# Patient Record
Sex: FEMALE | Race: WHITE | NOT HISPANIC OR LATINO | Employment: OTHER | URBAN - METROPOLITAN AREA
[De-identification: names, ages, dates, MRNs, and addresses within clinical notes are randomized per-mention and may not be internally consistent; named-entity substitution may affect disease eponyms.]

---

## 2017-01-31 RX ORDER — BACLOFEN 10 MG/1
TABLET ORAL
Qty: 90 TABLET | Refills: 1 | Status: SHIPPED | OUTPATIENT
Start: 2017-01-31 | End: 2017-04-06 | Stop reason: SDUPTHER

## 2017-02-28 DIAGNOSIS — F32.A DEPRESSION: ICD-10-CM

## 2017-02-28 DIAGNOSIS — I10 BENIGN ESSENTIAL HYPERTENSION: ICD-10-CM

## 2017-03-03 RX ORDER — CLONAZEPAM 1 MG/1
TABLET ORAL
Qty: 45 TABLET | Refills: 2 | OUTPATIENT
Start: 2017-03-03 | End: 2017-12-29 | Stop reason: SDUPTHER

## 2017-03-03 RX ORDER — VENLAFAXINE HYDROCHLORIDE 75 MG/1
CAPSULE, EXTENDED RELEASE ORAL
Qty: 30 CAPSULE | Refills: 0 | Status: SHIPPED | OUTPATIENT
Start: 2017-03-03 | End: 2017-10-11 | Stop reason: SDUPTHER

## 2017-03-03 RX ORDER — IRBESARTAN 150 MG/1
TABLET ORAL
Qty: 30 TABLET | Refills: 0 | Status: SHIPPED | OUTPATIENT
Start: 2017-03-03 | End: 2017-07-18 | Stop reason: SDUPTHER

## 2017-03-30 DIAGNOSIS — F32.A DEPRESSION: ICD-10-CM

## 2017-03-30 RX ORDER — VENLAFAXINE HYDROCHLORIDE 75 MG/1
CAPSULE, EXTENDED RELEASE ORAL
Qty: 90 CAPSULE | Refills: 0 | Status: SHIPPED | OUTPATIENT
Start: 2017-03-30 | End: 2017-05-03 | Stop reason: SDUPTHER

## 2017-04-06 RX ORDER — BACLOFEN 10 MG/1
TABLET ORAL
Qty: 90 TABLET | Refills: 1 | Status: SHIPPED | OUTPATIENT
Start: 2017-04-06 | End: 2017-05-19 | Stop reason: SDUPTHER

## 2017-04-27 ENCOUNTER — OFFICE VISIT (OUTPATIENT)
Dept: FAMILY MEDICINE CLINIC | Facility: CLINIC | Age: 52
End: 2017-04-27

## 2017-04-27 VITALS
HEIGHT: 68 IN | WEIGHT: 160 LBS | SYSTOLIC BLOOD PRESSURE: 140 MMHG | BODY MASS INDEX: 24.25 KG/M2 | OXYGEN SATURATION: 100 % | HEART RATE: 78 BPM | DIASTOLIC BLOOD PRESSURE: 96 MMHG

## 2017-04-27 DIAGNOSIS — B02.9 VARICELLA ZOSTER: Primary | ICD-10-CM

## 2017-04-27 PROCEDURE — 99213 OFFICE O/P EST LOW 20 MIN: CPT | Performed by: NURSE PRACTITIONER

## 2017-04-27 RX ORDER — GABAPENTIN 300 MG/1
CAPSULE ORAL
Refills: 0 | COMMUNITY
Start: 2017-04-13 | End: 2020-04-10

## 2017-04-27 RX ORDER — VALACYCLOVIR HYDROCHLORIDE 1 G/1
1000 TABLET, FILM COATED ORAL 3 TIMES DAILY
Qty: 21 TABLET | Refills: 0 | Status: SHIPPED | OUTPATIENT
Start: 2017-04-27 | End: 2017-05-12

## 2017-04-27 NOTE — PROGRESS NOTES
"Subjective Patient presents with a painful rash on her lower back that she noticed this morning on her way to work. The area does sting.   Jaimee Leonardo is a 52 y.o. female.     Chief Complaint   Patient presents with   • Rash   rash to back  Social History   Substance Use Topics   • Smoking status: Current Every Day Smoker     Packs/day: 0.50     Types: Cigarettes   • Smokeless tobacco: None   • Alcohol use None       History of Present Illness   Rash erupted last night, is very painful, pain is constant and there is nothing that relieves it.  The following portions of the patient's history were reviewed and updated as appropriate:PMHroutine: Past Medical History, Allergies, Current Medications, Active Problem List and Health Maintenance    Review of Systems   Skin: Positive for rash.       Objective   Vitals:    04/27/17 1135   BP: 140/96   Pulse: 78   SpO2: 100%   Weight: 160 lb (72.6 kg)   Height: 68\" (172.7 cm)     Body mass index is 24.33 kg/(m^2).    Physical Exam   Constitutional: She appears well-developed and well-nourished. No distress.   HENT:   Head: Normocephalic and atraumatic.   Right Ear: External ear normal.   Left Ear: External ear normal.   Eyes: EOM are normal.   Neck: Neck supple. No thyromegaly present.   Cardiovascular: Normal rate, regular rhythm and normal heart sounds.    Pulmonary/Chest: Effort normal and breath sounds normal.   Musculoskeletal: Normal range of motion.   Neurological: She is alert.   Skin: Skin is warm.   Linear vesicular rash to rt buttock   Nursing note and vitals reviewed.      Assessment/Plan   Problem List Items Addressed This Visit     None      Visit Diagnoses     Varicella zoster    -  Primary    Relevant Medications    valACYclovir (VALTREX) 1000 MG tablet             "

## 2017-04-28 ENCOUNTER — TELEPHONE (OUTPATIENT)
Dept: FAMILY MEDICINE CLINIC | Facility: CLINIC | Age: 52
End: 2017-04-28

## 2017-04-28 RX ORDER — ONDANSETRON 4 MG/1
4-8 TABLET, FILM COATED ORAL EVERY 8 HOURS PRN
Qty: 15 TABLET | Refills: 0 | Status: SHIPPED | OUTPATIENT
Start: 2017-04-28 | End: 2017-05-12

## 2017-05-03 DIAGNOSIS — F32.A DEPRESSION: ICD-10-CM

## 2017-05-03 RX ORDER — VENLAFAXINE HYDROCHLORIDE 75 MG/1
CAPSULE, EXTENDED RELEASE ORAL
Qty: 90 CAPSULE | Refills: 0 | Status: SHIPPED | OUTPATIENT
Start: 2017-05-03 | End: 2017-10-11 | Stop reason: SDUPTHER

## 2017-05-12 ENCOUNTER — OFFICE VISIT (OUTPATIENT)
Dept: FAMILY MEDICINE CLINIC | Facility: CLINIC | Age: 52
End: 2017-05-12

## 2017-05-12 VITALS
SYSTOLIC BLOOD PRESSURE: 124 MMHG | HEIGHT: 68 IN | BODY MASS INDEX: 23.95 KG/M2 | WEIGHT: 158 LBS | DIASTOLIC BLOOD PRESSURE: 84 MMHG

## 2017-05-12 DIAGNOSIS — K62.5 BRBPR (BRIGHT RED BLOOD PER RECTUM): ICD-10-CM
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
## 2017-05-12 DIAGNOSIS — K58.1 IRRITABLE BOWEL SYNDROME WITH CONSTIPATION: ICD-10-CM

## 2017-05-12 DIAGNOSIS — K30 INDIGESTION: ICD-10-CM

## 2017-05-12 DIAGNOSIS — Z00.00 HEALTHCARE MAINTENANCE: Primary | ICD-10-CM

## 2017-05-12 PROCEDURE — 90732 PPSV23 VACC 2 YRS+ SUBQ/IM: CPT | Performed by: FAMILY MEDICINE

## 2017-05-12 PROCEDURE — 90471 IMMUNIZATION ADMIN: CPT | Performed by: FAMILY MEDICINE

## 2017-05-12 PROCEDURE — 99396 PREV VISIT EST AGE 40-64: CPT | Performed by: FAMILY MEDICINE

## 2017-05-12 RX ORDER — HYDROCODONE BITARTRATE AND ACETAMINOPHEN 5; 325 MG/1; MG/1
TABLET ORAL
Refills: 0 | COMMUNITY
Start: 2017-02-16 | End: 2020-04-10

## 2017-05-12 RX ORDER — ACETAMINOPHEN AND CODEINE PHOSPHATE 300; 30 MG/1; MG/1
TABLET ORAL
Refills: 0 | COMMUNITY
Start: 2017-04-13 | End: 2017-10-11

## 2017-05-19 RX ORDER — RIZATRIPTAN BENZOATE 10 MG/1
TABLET ORAL
Qty: 12 TABLET | Refills: 1 | Status: SHIPPED | OUTPATIENT
Start: 2017-05-19 | End: 2017-08-24 | Stop reason: SDUPTHER

## 2017-05-19 RX ORDER — BACLOFEN 10 MG/1
TABLET ORAL
Qty: 90 TABLET | Refills: 1 | Status: SHIPPED | OUTPATIENT
Start: 2017-05-19 | End: 2018-05-15

## 2017-05-26 ENCOUNTER — TELEPHONE (OUTPATIENT)
Dept: FAMILY MEDICINE CLINIC | Facility: CLINIC | Age: 52
End: 2017-05-26

## 2017-05-26 LAB
CHOLEST SERPL-MCNC: 212 MG/DL (ref ?–200)
HDLC SERPL-MCNC: 20 MG/DL (ref ?–60)
LDLC/HDLC SERPL: 2.57 {RATIO}
TRIGL SERPL-MCNC: 167 MG/DL (ref ?–150)

## 2017-06-01 RX ORDER — VENLAFAXINE HYDROCHLORIDE 75 MG/1
CAPSULE, EXTENDED RELEASE ORAL
Qty: 90 CAPSULE | Refills: 0 | Status: SHIPPED | OUTPATIENT
Start: 2017-06-01 | End: 2017-06-30 | Stop reason: SDUPTHER

## 2017-06-30 RX ORDER — VENLAFAXINE HYDROCHLORIDE 75 MG/1
CAPSULE, EXTENDED RELEASE ORAL
Qty: 90 CAPSULE | Refills: 0 | Status: SHIPPED | OUTPATIENT
Start: 2017-06-30 | End: 2017-07-18 | Stop reason: SDUPTHER

## 2017-07-18 DIAGNOSIS — I10 BENIGN ESSENTIAL HYPERTENSION: ICD-10-CM

## 2017-07-18 RX ORDER — VENLAFAXINE HYDROCHLORIDE 75 MG/1
CAPSULE, EXTENDED RELEASE ORAL
Qty: 270 CAPSULE | Refills: 0 | Status: SHIPPED | OUTPATIENT
Start: 2017-07-18 | End: 2017-10-11 | Stop reason: SDUPTHER

## 2017-07-19 RX ORDER — IRBESARTAN 150 MG/1
150 TABLET ORAL DAILY
Qty: 90 TABLET | Refills: 0 | Status: SHIPPED | OUTPATIENT
Start: 2017-07-19 | End: 2017-10-16 | Stop reason: SDUPTHER

## 2017-07-20 ENCOUNTER — TELEPHONE (OUTPATIENT)
Dept: FAMILY MEDICINE CLINIC | Facility: CLINIC | Age: 52
End: 2017-07-20

## 2017-08-24 RX ORDER — RIZATRIPTAN BENZOATE 10 MG/1
TABLET ORAL
Qty: 12 TABLET | Refills: 1 | Status: SHIPPED | OUTPATIENT
Start: 2017-08-24 | End: 2018-05-15 | Stop reason: SDUPTHER

## 2017-10-11 ENCOUNTER — OFFICE VISIT (OUTPATIENT)
Dept: FAMILY MEDICINE CLINIC | Facility: CLINIC | Age: 52
End: 2017-10-11

## 2017-10-11 VITALS
RESPIRATION RATE: 16 BRPM | SYSTOLIC BLOOD PRESSURE: 138 MMHG | OXYGEN SATURATION: 98 % | WEIGHT: 167 LBS | HEIGHT: 68 IN | BODY MASS INDEX: 25.31 KG/M2 | DIASTOLIC BLOOD PRESSURE: 90 MMHG | HEART RATE: 75 BPM

## 2017-10-11 DIAGNOSIS — Z71.6 ENCOUNTER FOR SMOKING CESSATION COUNSELING: Primary | ICD-10-CM

## 2017-10-11 PROCEDURE — 99213 OFFICE O/P EST LOW 20 MIN: CPT | Performed by: NURSE PRACTITIONER

## 2017-10-11 RX ORDER — LIDOCAINE 50 MG/G
PATCH TOPICAL
COMMUNITY
Start: 2017-08-24 | End: 2021-08-06

## 2017-10-11 NOTE — PATIENT INSTRUCTIONS
Steps to Quit Smoking   Smoking tobacco can be harmful to your health and can affect almost every organ in your body. Smoking puts you, and those around you, at risk for developing many serious chronic diseases. Quitting smoking is difficult, but it is one of the best things that you can do for your health. It is never too late to quit.  WHAT ARE THE BENEFITS OF QUITTING SMOKING?  When you quit smoking, you lower your risk of developing serious diseases and conditions, such as:  · Lung cancer or lung disease, such as COPD.  · Heart disease.  · Stroke.  · Heart attack.  · Infertility.  · Osteoporosis and bone fractures.  Additionally, symptoms such as coughing, wheezing, and shortness of breath may get better when you quit. You may also find that you get sick less often because your body is stronger at fighting off colds and infections. If you are pregnant, quitting smoking can help to reduce your chances of having a baby of low birth weight.  HOW DO I GET READY TO QUIT?  When you decide to quit smoking, create a plan to make sure that you are successful. Before you quit:  · Pick a date to quit. Set a date within the next two weeks to give you time to prepare.  · Write down the reasons why you are quitting. Keep this list in places where you will see it often, such as on your bathroom mirror or in your car or wallet.  · Identify the people, places, things, and activities that make you want to smoke (triggers) and avoid them. Make sure to take these actions:    Throw away all cigarettes at home, at work, and in your car.    Throw away smoking accessories, such as ashtrays and lighters.    Clean your car and make sure to empty the ashtray.    Clean your home, including curtains and carpets.  · Tell your family, friends, and coworkers that you are quitting. Support from your loved ones can make quitting easier.  · Talk with your health care provider about your options for quitting smoking.  · Find out what treatment  "options are covered by your health insurance.  WHAT STRATEGIES CAN I USE TO QUIT SMOKING?   Talk with your healthcare provider about different strategies to quit smoking. Some strategies include:  · Quitting smoking altogether instead of gradually lessening how much you smoke over a period of time. Research shows that quitting \"cold turkey\" is more successful than gradually quitting.  · Attending in-person counseling to help you build problem-solving skills. You are more likely to have success in quitting if you attend several counseling sessions. Even short sessions of 10 minutes can be effective.  · Finding resources and support systems that can help you to quit smoking and remain smoke-free after you quit. These resources are most helpful when you use them often. They can include:    Online chats with a counselor.    Telephone quitlines.    Printed self-help materials.    Support groups or group counseling.    Text messaging programs.    Mobile phone applications.  · Taking medicines to help you quit smoking. (If you are pregnant or breastfeeding, talk with your health care provider first.) Some medicines contain nicotine and some do not. Both types of medicines help with cravings, but the medicines that include nicotine help to relieve withdrawal symptoms. Your health care provider may recommend:    Nicotine patches, gum, or lozenges.    Nicotine inhalers or sprays.    Non-nicotine medicine that is taken by mouth.  Talk with your health care provider about combining strategies, such as taking medicines while you are also receiving in-person counseling. Using these two strategies together makes you more likely to succeed in quitting than if you used either strategy on its own.  If you are pregnant or breastfeeding, talk with your health care provider about finding counseling or other support strategies to quit smoking. Do not take medicine to help you quit smoking unless told to do so by your health care " provider.  WHAT THINGS CAN I DO TO MAKE IT EASIER TO QUIT?  Quitting smoking might feel overwhelming at first, but there is a lot that you can do to make it easier. Take these important actions:  · Reach out to your family and friends and ask that they support and encourage you during this time. Call telephone quitlines, reach out to support groups, or work with a counselor for support.  · Ask people who smoke to avoid smoking around you.  · Avoid places that trigger you to smoke, such as bars, parties, or smoke-break areas at work.  · Spend time around people who do not smoke.  · Lessen stress in your life, because stress can be a smoking trigger for some people. To lessen stress, try:    Exercising regularly.    Deep-breathing exercises.    Yoga.    Meditating.    Performing a body scan. This involves closing your eyes, scanning your body from head to toe, and noticing which parts of your body are particularly tense. Purposefully relax the muscles in those areas.  · Download or purchase mobile phone or tablet apps (applications) that can help you stick to your quit plan by providing reminders, tips, and encouragement. There are many free apps, such as QuitGuide from the CDC (Centers for Disease Control and Prevention). You can find other support for quitting smoking (smoking cessation) through smokefree.gov and other websites.  HOW WILL I FEEL WHEN I QUIT SMOKING?  Within the first 24 hours of quitting smoking, you may start to feel some withdrawal symptoms. These symptoms are usually most noticeable 2-3 days after quitting, but they usually do not last beyond 2-3 weeks. Changes or symptoms that you might experience include:  · Mood swings.  · Restlessness, anxiety, or irritation.  · Difficulty concentrating.  · Dizziness.  · Strong cravings for sugary foods in addition to nicotine.  · Mild weight gain.  · Constipation.  · Nausea.  · Coughing or a sore throat.  · Changes in how your medicines work in your  body.  · A depressed mood.  · Difficulty sleeping (insomnia).  After the first 2-3 weeks of quitting, you may start to notice more positive results, such as:  · Improved sense of smell and taste.  · Decreased coughing and sore throat.  · Slower heart rate.  · Lower blood pressure.  · Clearer skin.  · The ability to breathe more easily.  · Fewer sick days.  Quitting smoking is very challenging for most people. Do not get discouraged if you are not successful the first time. Some people need to make many attempts to quit before they achieve long-term success. Do your best to stick to your quit plan, and talk with your health care provider if you have any questions or concerns.     This information is not intended to replace advice given to you by your health care provider. Make sure you discuss any questions you have with your health care provider.     Document Released: 12/12/2002 Document Revised: 05/03/2016 Document Reviewed: 05/03/2016  [employer] Patient Education ©2017 [employer]

## 2017-10-11 NOTE — PROGRESS NOTES
"Jaimee Leonardo is a 52 y.o. female.  Seen 10/11/2017    Assessment/Plan   Problem List Items Addressed This Visit     None             No Follow-up on file.  There are no Patient Instructions on file for this visit.    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  Chief Complaint   Patient presents with   • Nicotine Dependence     Social History   Substance Use Topics   • Smoking status: Current Every Day Smoker     Packs/day: 1.00     Years: 30.00     Types: Cigarettes   • Smokeless tobacco: Never Used   • Alcohol use Yes      Comment: 3/week       History of Present Illness     The following portions of the patient's history were reviewed and updated as appropriate:PMHroutine: Social history , Allergies, Current Medications and Active Problem List    Review of Systems   Constitutional: Negative for activity change and appetite change.   Psychiatric/Behavioral: Negative for sleep disturbance and suicidal ideas.       Objective   Vitals:    10/11/17 1007   BP: 138/90   Pulse: 75   Resp: 16   SpO2: 98%   Weight: 167 lb (75.8 kg)   Height: 68\" (172.7 cm)     Body mass index is 25.39 kg/(m^2).  Physical Exam   Constitutional: She appears well-developed and well-nourished. No distress.   HENT:   Head: Normocephalic and atraumatic.   Eyes: EOM are normal.   Pulmonary/Chest: Effort normal.   Musculoskeletal: Normal range of motion.   Neurological: She is alert.   Skin: Skin is warm.   Psychiatric: She has a normal mood and affect.   Nursing note and vitals reviewed.    Reviewed Data:  No visits with results within 1 Month(s) from this visit.  Latest known visit with results is:    Telephone on 05/26/2017   Component Date Value Ref Range Status   • Total Cholesterol 05/24/2017 212* 200 mg/dL Final   • Triglycerides 05/24/2017 167* 150 mg/dL Final   • HDL Cholesterol 05/24/2017 20  60 mg/dL Final   • LDL/HDL Ratio 05/24/2017 2.57   " Final

## 2017-10-16 DIAGNOSIS — I10 BENIGN ESSENTIAL HYPERTENSION: ICD-10-CM

## 2017-10-16 RX ORDER — IRBESARTAN 150 MG/1
TABLET ORAL
Qty: 30 TABLET | Refills: 0 | Status: SHIPPED | OUTPATIENT
Start: 2017-10-16 | End: 2017-11-20 | Stop reason: SDUPTHER

## 2017-10-31 RX ORDER — VENLAFAXINE HYDROCHLORIDE 75 MG/1
CAPSULE, EXTENDED RELEASE ORAL
Qty: 270 CAPSULE | Refills: 0 | Status: SHIPPED | OUTPATIENT
Start: 2017-10-31 | End: 2017-11-28 | Stop reason: SDUPTHER

## 2017-11-09 DIAGNOSIS — Z71.6 ENCOUNTER FOR SMOKING CESSATION COUNSELING: ICD-10-CM

## 2017-11-09 RX ORDER — VARENICLINE TARTRATE 1 MG/1
1 TABLET, FILM COATED ORAL 2 TIMES DAILY
Qty: 60 TABLET | Refills: 0 | Status: SHIPPED | OUTPATIENT
Start: 2017-11-09 | End: 2017-12-12 | Stop reason: SDUPTHER

## 2017-11-15 RX ORDER — CLONAZEPAM 1 MG/1
TABLET ORAL
Qty: 45 TABLET | Refills: 1 | OUTPATIENT
Start: 2017-11-15

## 2017-11-15 NOTE — TELEPHONE ENCOUNTER
Patient needs an appointment before we can refill Klonopin. Please contact her to schedule with Dr. Hurd.

## 2017-11-20 DIAGNOSIS — I10 BENIGN ESSENTIAL HYPERTENSION: ICD-10-CM

## 2017-11-21 RX ORDER — IRBESARTAN 150 MG/1
TABLET ORAL
Qty: 30 TABLET | Refills: 0 | Status: SHIPPED | OUTPATIENT
Start: 2017-11-21 | End: 2017-11-28 | Stop reason: SDUPTHER

## 2017-11-28 ENCOUNTER — OFFICE VISIT (OUTPATIENT)
Dept: FAMILY MEDICINE CLINIC | Facility: CLINIC | Age: 52
End: 2017-11-28

## 2017-11-28 VITALS
WEIGHT: 172 LBS | HEART RATE: 64 BPM | BODY MASS INDEX: 26.07 KG/M2 | HEIGHT: 68 IN | SYSTOLIC BLOOD PRESSURE: 124 MMHG | OXYGEN SATURATION: 99 % | DIASTOLIC BLOOD PRESSURE: 68 MMHG

## 2017-11-28 DIAGNOSIS — I10 BENIGN ESSENTIAL HYPERTENSION: Primary | ICD-10-CM

## 2017-11-28 DIAGNOSIS — F51.01 PRIMARY INSOMNIA: ICD-10-CM

## 2017-11-28 DIAGNOSIS — F17.210 CIGARETTE NICOTINE DEPENDENCE WITHOUT COMPLICATION: ICD-10-CM

## 2017-11-28 DIAGNOSIS — F41.9 ANXIETY: ICD-10-CM

## 2017-11-28 PROCEDURE — 99214 OFFICE O/P EST MOD 30 MIN: CPT | Performed by: FAMILY MEDICINE

## 2017-11-28 RX ORDER — TRAZODONE HYDROCHLORIDE 50 MG/1
25-50 TABLET ORAL NIGHTLY
Qty: 90 TABLET | Refills: 3 | Status: SHIPPED | OUTPATIENT
Start: 2017-11-28 | End: 2018-02-26

## 2017-11-28 RX ORDER — IRBESARTAN 150 MG/1
150 TABLET ORAL DAILY
Qty: 90 TABLET | Refills: 1 | Status: SHIPPED | OUTPATIENT
Start: 2017-11-28 | End: 2018-05-15 | Stop reason: SDUPTHER

## 2017-11-28 RX ORDER — VENLAFAXINE HYDROCHLORIDE 75 MG/1
75 CAPSULE, EXTENDED RELEASE ORAL DAILY
Qty: 90 CAPSULE | Refills: 1 | Status: SHIPPED | OUTPATIENT
Start: 2017-11-28 | End: 2018-05-15

## 2017-11-28 NOTE — PROGRESS NOTES
Jaimee Leonardo is a 52 y.o. female.      Assessment/Plan   Problem List Items Addressed This Visit        Cardiovascular and Mediastinum    Benign essential hypertension - Primary    Overview     [facility] 11/28/2017  BP is controlled well. She will recheck in six months. She will continue present meds.           Relevant Medications    irbesartan (AVAPRO) 150 MG tablet       Other    Anxiety    Overview     [facility] 11/28/2017  She has a lot of home issues. She occ takes an extra clonazepam          Relevant Medications    venlafaxine XR (EFFEXOR-XR) 75 MG 24 hr capsule    traZODone (DESYREL) 50 MG tablet    Insomnia    Overview     [facility] 11/28/2017  Gets about four hours of sleep at night.          Relevant Medications    traZODone (DESYREL) 50 MG tablet    Nicotine dependence    Overview     [facility] 11/28/2017  She is doing much better with chantix.                   Return in about 6 months (around 5/28/2018).      Chief Complaint   Patient presents with   • Hypertension   • Med Refill     Social History   Substance Use Topics   • Smoking status: Current Every Day Smoker     Packs/day: 1.00     Years: 30.00     Types: Cigarettes   • Smokeless tobacco: Never Used   • Alcohol use Yes      Comment: 3/week       History of Present Illness     Patient is here for follow-up of hypertension. She is not exercising because she has been having surgery and is adherent to a low-salt diet. Patient does check BP occasionally.. Patient denies chest pain and dyspnea. Cardiovascular risk factors: hypertension and smoking/ tobacco exposure. Use of agents associated with hypertension: none. History of target organ damage: none. She is compliant with meds.     The following portions of the patient's history were reviewed and updated as appropriate:PMHroutine: Social history , Allergies, Current Medications, Active Problem List and Health Maintenance    Review of Systems   Constitutional: Negative for activity change, appetite  "change, chills, fatigue, fever and unexpected weight change.   HENT: Negative for congestion, ear pain, hearing loss, nosebleeds, rhinorrhea and sore throat.    Eyes: Negative for pain, redness and visual disturbance.   Respiratory: Negative for cough, shortness of breath and wheezing.    Cardiovascular: Negative for chest pain, palpitations and leg swelling.   Gastrointestinal: Negative for abdominal pain, blood in stool, constipation, diarrhea, nausea and vomiting.   Endocrine: Negative for cold intolerance and heat intolerance.   Genitourinary: Negative for difficulty urinating, dysuria, frequency, hematuria, pelvic pain, urgency and vaginal discharge.   Musculoskeletal: Negative for arthralgias, back pain and joint swelling.   Skin: Negative for rash and wound.   Neurological: Negative for dizziness, weakness, numbness and headaches.   Hematological: Does not bruise/bleed easily.   Psychiatric/Behavioral: Negative for dysphoric mood, sleep disturbance and suicidal ideas. The patient is not nervous/anxious.        Objective   Vitals:    11/28/17 1109   BP: 124/68   Pulse: 64   SpO2: 99%   Weight: 172 lb (78 kg)   Height: 68\" (172.7 cm)     Body mass index is 26.15 kg/(m^2).  Physical Exam   Constitutional: She is oriented to person, place, and time. Vital signs are normal. She appears well-developed and well-nourished. No distress.   HENT:   Head: Normocephalic.   Cardiovascular: Normal rate, regular rhythm and normal heart sounds.    Pulmonary/Chest: Effort normal and breath sounds normal.   Neurological: She is alert and oriented to person, place, and time. Gait normal.   Psychiatric: She has a normal mood and affect. Her behavior is normal. Judgment and thought content normal.   Vitals reviewed.    Reviewed Data:        "

## 2017-12-13 RX ORDER — VARENICLINE TARTRATE 1 MG/1
TABLET, FILM COATED ORAL
Qty: 56 TABLET | Refills: 0 | Status: SHIPPED | OUTPATIENT
Start: 2017-12-13 | End: 2018-01-15 | Stop reason: SDUPTHER

## 2017-12-29 DIAGNOSIS — I10 BENIGN ESSENTIAL HYPERTENSION: ICD-10-CM

## 2017-12-29 RX ORDER — CLONAZEPAM 1 MG/1
TABLET ORAL
Qty: 45 TABLET | Refills: 0 | OUTPATIENT
Start: 2017-12-29 | End: 2018-01-29 | Stop reason: SDUPTHER

## 2017-12-29 RX ORDER — IRBESARTAN 150 MG/1
TABLET ORAL
Qty: 30 TABLET | Refills: 4 | Status: SHIPPED | OUTPATIENT
Start: 2017-12-29 | End: 2018-05-15

## 2018-01-03 RX ORDER — OMEPRAZOLE 40 MG/1
CAPSULE, DELAYED RELEASE ORAL
Qty: 90 CAPSULE | Refills: 1 | Status: SHIPPED | OUTPATIENT
Start: 2018-01-03

## 2018-01-03 RX ORDER — VENLAFAXINE HYDROCHLORIDE 75 MG/1
CAPSULE, EXTENDED RELEASE ORAL
Qty: 270 CAPSULE | Refills: 1 | Status: SHIPPED | OUTPATIENT
Start: 2018-01-03 | End: 2018-05-15 | Stop reason: SDUPTHER

## 2018-01-15 RX ORDER — VARENICLINE TARTRATE 1 MG/1
TABLET, FILM COATED ORAL
Qty: 56 TABLET | Refills: 0 | Status: SHIPPED | OUTPATIENT
Start: 2018-01-15 | End: 2018-02-22 | Stop reason: SDUPTHER

## 2018-01-31 RX ORDER — CLONAZEPAM 1 MG/1
TABLET ORAL
Qty: 45 TABLET | Refills: 1 | OUTPATIENT
Start: 2018-01-31 | End: 2018-03-01 | Stop reason: SDUPTHER

## 2018-02-22 RX ORDER — VARENICLINE TARTRATE 1 MG/1
TABLET, FILM COATED ORAL
Qty: 56 TABLET | Refills: 0 | Status: SHIPPED | OUTPATIENT
Start: 2018-02-22 | End: 2018-05-15

## 2018-03-02 RX ORDER — CLONAZEPAM 1 MG/1
TABLET ORAL
Qty: 45 TABLET | Refills: 1 | Status: SHIPPED | OUTPATIENT
Start: 2018-03-02 | End: 2018-05-07 | Stop reason: SDUPTHER

## 2018-03-02 NOTE — TELEPHONE ENCOUNTER
When you send me these I need to know when it was last filled. This says the last refill was 3/1, in which case I certainly wouldn't refill it but that doesn't make sense since yesterday was 3/1. Thx.

## 2018-04-12 RX ORDER — CLONAZEPAM 1 MG/1
TABLET ORAL
Qty: 45 TABLET | Refills: 1 | OUTPATIENT
Start: 2018-04-12

## 2018-04-12 NOTE — TELEPHONE ENCOUNTER
Disregard. Spoke to pharmacist and they have a prescription waiting for her. Pt called in wrong Rx #

## 2018-05-07 RX ORDER — CLONAZEPAM 1 MG/1
TABLET ORAL
Qty: 45 TABLET | Refills: 1 | Status: SHIPPED | OUTPATIENT
Start: 2018-05-07 | End: 2018-07-26 | Stop reason: SDUPTHER

## 2018-05-15 ENCOUNTER — OFFICE VISIT (OUTPATIENT)
Dept: FAMILY MEDICINE CLINIC | Facility: CLINIC | Age: 53
End: 2018-05-15

## 2018-05-15 VITALS
SYSTOLIC BLOOD PRESSURE: 102 MMHG | DIASTOLIC BLOOD PRESSURE: 68 MMHG | RESPIRATION RATE: 16 BRPM | HEIGHT: 68 IN | HEART RATE: 74 BPM | OXYGEN SATURATION: 99 % | BODY MASS INDEX: 24.86 KG/M2 | WEIGHT: 164 LBS

## 2018-05-15 DIAGNOSIS — G43.109 MIGRAINE WITH AURA AND WITHOUT STATUS MIGRAINOSUS, NOT INTRACTABLE: ICD-10-CM

## 2018-05-15 DIAGNOSIS — I10 BENIGN ESSENTIAL HYPERTENSION: ICD-10-CM

## 2018-05-15 DIAGNOSIS — F41.9 ANXIETY: ICD-10-CM

## 2018-05-15 DIAGNOSIS — Z00.00 HEALTHCARE MAINTENANCE: Primary | ICD-10-CM

## 2018-05-15 PROCEDURE — 99396 PREV VISIT EST AGE 40-64: CPT | Performed by: FAMILY MEDICINE

## 2018-05-15 RX ORDER — IRBESARTAN 150 MG/1
150 TABLET ORAL DAILY
Qty: 90 TABLET | Refills: 1 | Status: SHIPPED | OUTPATIENT
Start: 2018-05-15 | End: 2018-12-07 | Stop reason: SDUPTHER

## 2018-05-15 RX ORDER — VENLAFAXINE HYDROCHLORIDE 75 MG/1
225 CAPSULE, EXTENDED RELEASE ORAL DAILY
Qty: 270 CAPSULE | Refills: 3 | Status: SHIPPED | OUTPATIENT
Start: 2018-05-15 | End: 2019-08-01 | Stop reason: SDUPTHER

## 2018-05-15 RX ORDER — RIZATRIPTAN BENZOATE 10 MG/1
TABLET ORAL
Qty: 12 TABLET | Refills: 1 | Status: SHIPPED | OUTPATIENT
Start: 2018-05-15 | End: 2019-01-29 | Stop reason: SDUPTHER

## 2018-05-15 RX ORDER — TIZANIDINE 4 MG/1
4 TABLET ORAL EVERY 8 HOURS PRN
COMMUNITY
End: 2018-11-01

## 2018-05-15 NOTE — ASSESSMENT & PLAN NOTE
[facility] 5/15/2018  She wants to quit. She stopped for six months and then went back. Wants to use chantix again.

## 2018-05-15 NOTE — PROGRESS NOTES
Problem List Items Addressed This Visit        Cardiovascular and Mediastinum    Benign essential hypertension    Overview     [facility] 5/15/2018  BP is controlled well. She will recheck in six months. She will continue present meds.           Relevant Medications    irbesartan (AVAPRO) 150 MG tablet    Migraine with aura and without status migrainosus, not intractable    Relevant Medications    tiZANidine (ZANAFLEX) 4 MG tablet    venlafaxine XR (EFFEXOR-XR) 75 MG 24 hr capsule    rizatriptan (MAXALT) 10 MG tablet       Other    Anxiety    Overview     [facility] 5/15/2018  She has a lot of home issues. She occ takes an extra clonazepam. Refilling her Effexor today         Relevant Medications    venlafaxine XR (EFFEXOR-XR) 75 MG 24 hr capsule      Other Visit Diagnoses     Healthcare maintenance    -  Primary             Return in about 1 year (around 5/15/2019).  There are no Patient Instructions on file for this visit.  Jaimee Leonardo is a 53 y.o. female being seen in our office today for Annual Exam                 She  reports that she has been smoking Cigarettes.  She has a 15.00 pack-year smoking history. She has never used smokeless tobacco. She reports that she drinks alcohol. She reports that she does not use drugs.             HPI Annual Exam: Patient presents for annual exam.  findings; last pap: approximate date 2015 and was normal ?HPV Positive  Last mammo: approximate date 4/2017 and was normal   The patient is not  still having menses.Last menstrual period: > 5 years ago.  History; colonoscopy: Last colonoscopy: colonoscopy 1 years ago with abnormalities -- polyp.    The patient wears seatbelts: yes.  The patient regularly exercises: yes at work she exercises. She is having a lot of trigger points. She does work is aerobic intense (many miles a day)   This patient has ever been tested for HepC: no    She does see a dentist regularly.   Her immunization are up-to-date.  She is eating a healthy diet.      She quit smoking for about six months and had a trauma at home with her son (who was suicidal) and started back. One pack is lasting her three days.              The following portions of the patient's history were reviewed and updated as appropriate:PMHroutine: Social history , Past Medical History, Surgical history , Allergies, Current Medications, Active Problem List, Family History and Health Maintenance            Review of Systems   Constitutional: Negative for activity change, appetite change, chills, fatigue, fever and unexpected weight change.   HENT: Negative for congestion, ear pain, hearing loss, nosebleeds, rhinorrhea and sore throat.    Eyes: Negative for pain, redness and visual disturbance.   Respiratory: Negative for cough, shortness of breath and wheezing.    Cardiovascular: Negative for chest pain, palpitations and leg swelling.   Gastrointestinal: Positive for nausea (Started about a month ago, no RUQ pain, worse with eating, better with bland foods. Doesn't wake her up. Comes and goes. Similar to her mom's GB symptoms. ). Negative for abdominal pain, blood in stool, constipation, diarrhea and vomiting.   Endocrine: Negative for cold intolerance and heat intolerance.   Genitourinary: Negative for difficulty urinating, dysuria, frequency, hematuria, pelvic pain, urgency and vaginal discharge.   Musculoskeletal: Negative for arthralgias, back pain and joint swelling.   Skin: Negative for rash and wound.   Neurological: Positive for numbness. Negative for dizziness, weakness and headaches.   Hematological: Does not bruise/bleed easily.   Psychiatric/Behavioral: Negative for dysphoric mood, sleep disturbance and suicidal ideas. The patient is not nervous/anxious.                  BP Readings from Last 1 Encounters:   05/15/18 102/68     Wt Readings from Last 3 Encounters:   05/15/18 74.4 kg (164 lb)   11/28/17 78 kg (172 lb)   10/11/17 75.8 kg (167 lb)   Body mass index is 24.94 kg/m².                  Physical Exam   Constitutional: She is oriented to person, place, and time. She appears well-developed and well-nourished. No distress.   HENT:   Head: Normocephalic and atraumatic.   Right Ear: Tympanic membrane, external ear and ear canal normal.   Left Ear: Tympanic membrane, external ear and ear canal normal.   Mouth/Throat: Oropharynx is clear and moist.   Eyes: Conjunctivae are normal.   Neck: Normal range of motion. Neck supple. No tracheal deviation present. No thyromegaly present.   Cardiovascular: Normal rate, regular rhythm, normal heart sounds and intact distal pulses.  Exam reveals no gallop.    No murmur heard.  Pulmonary/Chest: Effort normal and breath sounds normal. No respiratory distress. She has no wheezes. She has no rales. She exhibits no tenderness. Right breast exhibits no mass, no nipple discharge and no skin change. Left breast exhibits no mass, no nipple discharge and no skin change. Breasts are symmetrical. There is no breast swelling.   Abdominal: Soft. Bowel sounds are normal. She exhibits no distension. There is no hepatosplenomegaly. There is no tenderness.   Genitourinary: Vagina normal and uterus normal. No breast tenderness. Pelvic exam was performed with patient supine. There is no rash or lesion on the right labia. There is no rash or lesion on the left labia. Uterus is not enlarged and not tender. Cervix exhibits no motion tenderness, no discharge and no friability. Right adnexum displays no mass, no tenderness and no fullness. Left adnexum displays no mass, no tenderness and no fullness. No erythema, tenderness or bleeding in the vagina. No vaginal discharge found.   Musculoskeletal: She exhibits no edema or deformity.   Lymphadenopathy:     She has no cervical adenopathy.   Neurological: She is alert and oriented to person, place, and time.   Skin: Skin is warm and dry. No rash noted.   Psychiatric: She has a normal mood and affect. Her behavior is normal. Judgment and  thought content normal.   Vitals reviewed.

## 2018-05-18 LAB
CYTOLOGIST CVX/VAG CYTO: NORMAL
CYTOLOGY CVX/VAG DOC THIN PREP: NORMAL
DX ICD CODE: NORMAL
HIV 1 & 2 AB SER-IMP: NORMAL
HPV I/H RISK 1 DNA CVX QL PROBE+SIG AMP: NORMAL
HPV I/H RISK 4 DNA CVX QL PROBE+SIG AMP: NEGATIVE
Lab: NORMAL
OTHER STN SPEC: NORMAL
PATH REPORT.FINAL DX SPEC: NORMAL
STAT OF ADQ CVX/VAG CYTO-IMP: NORMAL

## 2018-06-21 RX ORDER — VARENICLINE TARTRATE 1 MG/1
TABLET, FILM COATED ORAL
Qty: 56 TABLET | Refills: 0 | OUTPATIENT
Start: 2018-06-21

## 2018-06-26 ENCOUNTER — TELEPHONE (OUTPATIENT)
Dept: FAMILY MEDICINE CLINIC | Facility: CLINIC | Age: 53
End: 2018-06-26

## 2018-06-26 RX ORDER — VARENICLINE TARTRATE 1 MG/1
1 TABLET, FILM COATED ORAL 2 TIMES DAILY
Qty: 60 TABLET | Refills: 5 | Status: SHIPPED | OUTPATIENT
Start: 2018-06-26 | End: 2019-09-09

## 2018-06-26 NOTE — TELEPHONE ENCOUNTER
Patient called stating that she was supposed to get refills on her chantex but I do not see this in the chart. Please advise.

## 2018-07-26 RX ORDER — CLONAZEPAM 1 MG/1
TABLET ORAL
Qty: 45 TABLET | Refills: 1 | Status: SHIPPED | OUTPATIENT
Start: 2018-07-26 | End: 2018-09-21 | Stop reason: SDUPTHER

## 2018-09-21 RX ORDER — CLONAZEPAM 1 MG/1
TABLET ORAL
Qty: 45 TABLET | Refills: 0 | Status: SHIPPED | OUTPATIENT
Start: 2018-09-21 | End: 2018-12-07 | Stop reason: SDUPTHER

## 2018-11-01 ENCOUNTER — OFFICE VISIT (OUTPATIENT)
Dept: FAMILY MEDICINE CLINIC | Facility: CLINIC | Age: 53
End: 2018-11-01

## 2018-11-01 VITALS
OXYGEN SATURATION: 97 % | HEART RATE: 78 BPM | DIASTOLIC BLOOD PRESSURE: 72 MMHG | HEIGHT: 68 IN | BODY MASS INDEX: 23.22 KG/M2 | SYSTOLIC BLOOD PRESSURE: 124 MMHG | WEIGHT: 153.2 LBS | RESPIRATION RATE: 18 BRPM

## 2018-11-01 DIAGNOSIS — F17.200 NICOTINE DEPENDENCE WITH CURRENT USE: ICD-10-CM

## 2018-11-01 DIAGNOSIS — J20.9 BRONCHOSPASM WITH BRONCHITIS, ACUTE: Primary | ICD-10-CM

## 2018-11-01 PROCEDURE — 94640 AIRWAY INHALATION TREATMENT: CPT | Performed by: NURSE PRACTITIONER

## 2018-11-01 PROCEDURE — 99213 OFFICE O/P EST LOW 20 MIN: CPT | Performed by: NURSE PRACTITIONER

## 2018-11-01 RX ORDER — TRAZODONE HYDROCHLORIDE 50 MG/1
TABLET ORAL
Refills: 3 | COMMUNITY
Start: 2018-09-06 | End: 2018-12-07 | Stop reason: SDUPTHER

## 2018-11-01 RX ORDER — AZITHROMYCIN 250 MG/1
TABLET, FILM COATED ORAL
Qty: 6 TABLET | Refills: 0 | Status: SHIPPED | OUTPATIENT
Start: 2018-11-01 | End: 2019-09-09

## 2018-11-01 RX ORDER — BACLOFEN 10 MG/1
TABLET ORAL
COMMUNITY
Start: 2018-10-12

## 2018-11-01 RX ORDER — METHYLPREDNISOLONE 4 MG/1
TABLET ORAL
Qty: 21 TABLET | Refills: 0 | Status: SHIPPED | OUTPATIENT
Start: 2018-11-01 | End: 2019-09-09

## 2018-11-01 RX ORDER — IPRATROPIUM BROMIDE AND ALBUTEROL SULFATE 2.5; .5 MG/3ML; MG/3ML
3 SOLUTION RESPIRATORY (INHALATION) ONCE
Status: COMPLETED | OUTPATIENT
Start: 2018-11-01 | End: 2018-11-01

## 2018-11-01 RX ORDER — BUDESONIDE AND FORMOTEROL FUMARATE DIHYDRATE 160; 4.5 UG/1; UG/1
2 AEROSOL RESPIRATORY (INHALATION)
Qty: 6 G | Refills: 11 | Status: SHIPPED | OUTPATIENT
Start: 2018-11-01 | End: 2020-04-10

## 2018-11-01 RX ORDER — ALBUTEROL SULFATE 90 UG/1
2 AEROSOL, METERED RESPIRATORY (INHALATION) EVERY 4 HOURS PRN
Qty: 1 INHALER | Refills: 11 | Status: SHIPPED | OUTPATIENT
Start: 2018-11-01 | End: 2021-01-14

## 2018-11-01 RX ADMIN — IPRATROPIUM BROMIDE AND ALBUTEROL SULFATE 3 ML: 2.5; .5 SOLUTION RESPIRATORY (INHALATION) at 13:51

## 2018-11-01 NOTE — PROGRESS NOTES
Jaimee Leonardo is a 53 y.o. female.Pt is here for head ache, sinus pressure, chest congestion and sore throat. She started feeling sick past Saturday. She is been in bed for two days. Feels lot of pressure and fluid feeling in both ears, lot of nasal drainage and her chest feels congested. She felt like had fever, and it's been using Night quil. Denies abdominal problems, nausea or diarrhea. Pt is on Chantix trying to stop. 24 year old son is sick.   Cough: Patient complains of bilateral ear congestion, myalgias, nasal congestion, nonproductive cough, productive cough and sore throat.  Symptoms began 5 days ago.  The cough is productive of green/yellow sputum, with wheezing and is aggravated by nothing Associated symptoms include:change in voice, postnasal drip and sputum production. Patient does not have a history of asthma.   Patient does have a history of smoking.      Assessment/Plan   Problem List Items Addressed This Visit     None      Visit Diagnoses     Bronchospasm with bronchitis, acute    -  Primary    Relevant Medications    ipratropium-albuterol (DUO-NEB) nebulizer solution 3 mL (Completed)    azithromycin (ZITHROMAX Z-AVE) 250 MG tablet    MethylPREDNISolone (MEDROL, AVE,) 4 MG tablet    albuterol (PROVENTIL HFA;VENTOLIN HFA) 108 (90 Base) MCG/ACT inhaler    budesonide-formoterol (SYMBICORT) 160-4.5 MCG/ACT inhaler    Nicotine dependence with current use                 No Follow-up on file.  There are no Patient Instructions on file for this visit.    Chief Complaint   Patient presents with   • Cough   • Sore Throat     Social History   Substance Use Topics   • Smoking status: Current Every Day Smoker     Packs/day: 0.50     Years: 30.00     Types: Cigarettes   • Smokeless tobacco: Never Used   • Alcohol use Yes      Comment: 3/week       History of Present Illness     The following portions of the patient's history were reviewed and updated as appropriate:PMHroutine: Social history , Allergies,  "Current Medications, Active Problem List and Health Maintenance    Review of Systems   Respiratory: Positive for cough. Negative for apnea, choking, chest tightness, shortness of breath, wheezing and stridor.    Cardiovascular: Negative for chest pain, palpitations and leg swelling.       Objective   Vitals:    11/01/18 1317   BP: 124/72   Pulse: 78   Resp: 18   SpO2: 97%   Weight: 69.5 kg (153 lb 3.2 oz)   Height: 172.7 cm (68\")     Body mass index is 23.29 kg/m².  Physical Exam   Constitutional: She is oriented to person, place, and time. She appears well-developed and well-nourished. No distress.   HENT:   Head: Normocephalic and atraumatic.   Right Ear: External ear normal.   Left Ear: External ear normal.   Nose: Nose normal.   Mouth/Throat: Oropharynx is clear and moist. No oropharyngeal exudate.   Eyes: Pupils are equal, round, and reactive to light. Conjunctivae and EOM are normal.   Neck: Normal range of motion.   Cardiovascular: Normal rate and regular rhythm.    Pulmonary/Chest: Effort normal. No stridor. No respiratory distress. She has wheezes. She has no rales. She exhibits no tenderness.   Lymphadenopathy:     She has no cervical adenopathy.   Neurological: She is alert and oriented to person, place, and time.   Skin: Skin is warm and dry.   Psychiatric: She has a normal mood and affect. Her behavior is normal.   Nursing note and vitals reviewed.    Reviewed Data:  No visits with results within 1 Month(s) from this visit.   Latest known visit with results is:   Office Visit on 05/15/2018   Component Date Value Ref Range Status   • Diagnosis 05/15/2018 Comment   Final    Comment: NEGATIVE FOR INTRAEPITHELIAL LESION AND MALIGNANCY.  THIS SPECIMEN WAS RESCREENED AS PART OF OUR  PROGRAM.     • Specimen adequacy: 05/15/2018 Comment   Final    Comment: Satisfactory for evaluation.  Endocervical and/or squamous metaplastic  cells (endocervical component) are present.     • Clinician Provided " ICD-10: 05/15/2018 Comment   Final    Z00.00   • Performed by: 05/15/2018 Comment   Final    Helder Mark, Cytotechnologist ([facility])   • QC reviewed by: 05/15/2018 Comment   Final    Denisse Teresa, Cytotechnologist ([facility])   • . 05/15/2018 .   Final   • Note: 05/15/2018 Comment   Final    Comment: The Pap smear is a screening test designed to aid in the detection of  premalignant and malignant conditions of the uterine cervix.  It is not a  diagnostic procedure and should not be used as the sole means of detecting  cervical cancer.  Both false-positive and false-negative reports do occur.     • Method: 05/15/2018 Comment   Final    Comment: This liquid based ThinPrep(R) pap test was screened with the  use of an image guided system.     • HPV, high-risk 05/15/2018 CANCELED   Final-Edited    Comment: The quantity of specimen remaining in the vial after Pap slide  preparation was less than the 4 mL minimum cell suspension required.  Low sample cellularity may be the cause.  See HPV, low volume rfx  test result.  This high-risk HPV test detects thirteen high-risk types  (16/18/31/33/35/39/45/51/52/56/58/59/68) without differentiation.    Result canceled by the ancillary     • HPV, low volume rfx 05/15/2018 Negative  Negative Final    Comment: This test detects fourteen high-risk HPV types (16,18,31,33,35,39,45,  51,52,56,58,59,66,68) without differentiation.

## 2018-11-02 RX ORDER — ALBUTEROL SULFATE 90 UG/1
2 AEROSOL, METERED RESPIRATORY (INHALATION) EVERY 4 HOURS PRN
Qty: 18 G | Refills: 11 | Status: SHIPPED | OUTPATIENT
Start: 2018-11-02 | End: 2019-09-09 | Stop reason: SDUPTHER

## 2018-12-07 DIAGNOSIS — I10 BENIGN ESSENTIAL HYPERTENSION: ICD-10-CM

## 2018-12-07 RX ORDER — TRAZODONE HYDROCHLORIDE 50 MG/1
50 TABLET ORAL NIGHTLY
Qty: 90 TABLET | Refills: 1 | Status: SHIPPED | OUTPATIENT
Start: 2018-12-07 | End: 2019-05-23 | Stop reason: SDUPTHER

## 2018-12-07 RX ORDER — IRBESARTAN 150 MG/1
150 TABLET ORAL DAILY
Qty: 90 TABLET | Refills: 1 | Status: SHIPPED | OUTPATIENT
Start: 2018-12-07 | End: 2019-06-10 | Stop reason: SDUPTHER

## 2018-12-07 RX ORDER — CLONAZEPAM 1 MG/1
TABLET ORAL
Qty: 45 TABLET | Refills: 0 | Status: SHIPPED | OUTPATIENT
Start: 2018-12-07 | End: 2019-03-06 | Stop reason: SDUPTHER

## 2019-01-29 DIAGNOSIS — G43.109 MIGRAINE WITH AURA AND WITHOUT STATUS MIGRAINOSUS, NOT INTRACTABLE: ICD-10-CM

## 2019-01-29 RX ORDER — RIZATRIPTAN BENZOATE 10 MG/1
TABLET ORAL
Qty: 12 TABLET | Refills: 1 | Status: SHIPPED | OUTPATIENT
Start: 2019-01-29 | End: 2019-09-27 | Stop reason: SDUPTHER

## 2019-03-06 RX ORDER — CLONAZEPAM 1 MG/1
TABLET ORAL
Qty: 45 TABLET | Refills: 0 | Status: SHIPPED | OUTPATIENT
Start: 2019-03-06 | End: 2019-04-11 | Stop reason: SDUPTHER

## 2019-04-12 RX ORDER — CLONAZEPAM 1 MG/1
TABLET ORAL
Qty: 45 TABLET | Refills: 0 | Status: SHIPPED | OUTPATIENT
Start: 2019-04-12 | End: 2019-05-23 | Stop reason: SDUPTHER

## 2019-04-16 ENCOUNTER — TELEPHONE (OUTPATIENT)
Dept: FAMILY MEDICINE CLINIC | Facility: CLINIC | Age: 54
End: 2019-04-16

## 2019-04-16 NOTE — TELEPHONE ENCOUNTER
----- Message from Saumya Hurd MD sent at 4/11/2019  3:58 PM EDT -----  Actually the alfredo is not in the chart so it is in Horace's hands.  ----- Message -----  From: Sanjuana Peace  Sent: 4/11/2019   3:15 PM  To: Saumya Hurd MD        ----- Message -----  From: Chandan Santiago MA  Sent: 4/11/2019   2:34 PM  To: Sanjuana Peace    The request has been sent to Dr. Hurd. Its in her hands at this point.   ----- Message -----  From: Sanjuana Peace  Sent: 4/11/2019   9:36 AM  To: Chandan Santiago MA      clonazePAM (KlonoPIN) 1 MG tablet - requested refill - pharmacy was supposed to fax us - she needs today or tomorrow AM at latest

## 2019-05-23 ENCOUNTER — TELEPHONE (OUTPATIENT)
Dept: FAMILY MEDICINE CLINIC | Facility: CLINIC | Age: 54
End: 2019-05-23

## 2019-05-23 RX ORDER — TRAZODONE HYDROCHLORIDE 50 MG/1
50 TABLET ORAL NIGHTLY
Qty: 90 TABLET | Refills: 1 | Status: SHIPPED | OUTPATIENT
Start: 2019-05-23 | End: 2019-12-05 | Stop reason: SDUPTHER

## 2019-05-23 RX ORDER — CLONAZEPAM 1 MG/1
TABLET ORAL
Qty: 45 TABLET | Refills: 0 | Status: SHIPPED | OUTPATIENT
Start: 2019-05-23 | End: 2019-07-02 | Stop reason: SDUPTHER

## 2019-05-23 NOTE — TELEPHONE ENCOUNTER
Patient called for refills on Clonazepam and trazodone she stated her pharmacy told her they had sent request for refills through e scribing informed patient we had not received any refill request from them for either medication.  Patient informed would put refills in and send to pharmacy for her patient stated understanding

## 2019-06-10 DIAGNOSIS — I10 BENIGN ESSENTIAL HYPERTENSION: ICD-10-CM

## 2019-06-10 RX ORDER — IRBESARTAN 150 MG/1
150 TABLET ORAL DAILY
Qty: 90 TABLET | Refills: 0 | Status: SHIPPED | OUTPATIENT
Start: 2019-06-10 | End: 2019-08-07 | Stop reason: SDUPTHER

## 2019-07-02 RX ORDER — CLONAZEPAM 1 MG/1
TABLET ORAL
Qty: 45 TABLET | Refills: 0 | Status: SHIPPED | OUTPATIENT
Start: 2019-07-02 | End: 2019-08-14 | Stop reason: SDUPTHER

## 2019-08-01 DIAGNOSIS — F41.9 ANXIETY: ICD-10-CM

## 2019-08-01 RX ORDER — VENLAFAXINE HYDROCHLORIDE 75 MG/1
225 CAPSULE, EXTENDED RELEASE ORAL DAILY
Qty: 270 CAPSULE | Refills: 3 | Status: SHIPPED | OUTPATIENT
Start: 2019-08-01 | End: 2020-08-04

## 2019-08-07 DIAGNOSIS — I10 BENIGN ESSENTIAL HYPERTENSION: ICD-10-CM

## 2019-08-07 RX ORDER — IRBESARTAN 150 MG/1
150 TABLET ORAL DAILY
Qty: 90 TABLET | Refills: 0 | Status: SHIPPED | OUTPATIENT
Start: 2019-08-07 | End: 2019-12-04 | Stop reason: SDUPTHER

## 2019-08-15 RX ORDER — CLONAZEPAM 1 MG/1
0.5 TABLET ORAL 2 TIMES DAILY PRN
Qty: 45 TABLET | Refills: 0 | Status: SHIPPED | OUTPATIENT
Start: 2019-08-15 | End: 2019-09-24 | Stop reason: SDUPTHER

## 2019-09-09 ENCOUNTER — OFFICE VISIT (OUTPATIENT)
Dept: FAMILY MEDICINE CLINIC | Facility: CLINIC | Age: 54
End: 2019-09-09

## 2019-09-09 VITALS
OXYGEN SATURATION: 99 % | WEIGHT: 142.2 LBS | BODY MASS INDEX: 21.55 KG/M2 | DIASTOLIC BLOOD PRESSURE: 82 MMHG | HEIGHT: 68 IN | HEART RATE: 79 BPM | SYSTOLIC BLOOD PRESSURE: 122 MMHG | RESPIRATION RATE: 14 BRPM

## 2019-09-09 DIAGNOSIS — I10 BENIGN ESSENTIAL HYPERTENSION: ICD-10-CM

## 2019-09-09 DIAGNOSIS — Z00.00 HEALTHCARE MAINTENANCE: Primary | ICD-10-CM

## 2019-09-09 DIAGNOSIS — F17.210 CIGARETTE NICOTINE DEPENDENCE WITHOUT COMPLICATION: ICD-10-CM

## 2019-09-09 PROBLEM — Z86.0100 HX OF COLONIC POLYP: Status: ACTIVE | Noted: 2019-09-09

## 2019-09-09 PROBLEM — Z86.010 HX OF COLONIC POLYP: Status: ACTIVE | Noted: 2019-09-09

## 2019-09-09 PROCEDURE — 99396 PREV VISIT EST AGE 40-64: CPT | Performed by: FAMILY MEDICINE

## 2019-09-09 PROCEDURE — 90471 IMMUNIZATION ADMIN: CPT | Performed by: FAMILY MEDICINE

## 2019-09-09 PROCEDURE — 90750 HZV VACC RECOMBINANT IM: CPT | Performed by: FAMILY MEDICINE

## 2019-09-09 RX ORDER — NALOXEGOL OXALATE 25 MG/1
TABLET, FILM COATED ORAL
COMMUNITY
Start: 2019-08-26 | End: 2020-04-10

## 2019-09-09 RX ORDER — DOXYCYCLINE 100 MG/1
100 CAPSULE ORAL
COMMUNITY
Start: 2019-09-04 | End: 2019-09-09

## 2019-09-09 NOTE — PROGRESS NOTES
ASSESSMENT AND PLAN  Problem List Items Addressed This Visit        Cardiovascular and Mediastinum    Benign essential hypertension    Overview     Mejia 9/9/2019  BP is controlled well. She will recheck in six months. She will continue present meds.              Other    Nicotine dependence    Current Assessment & Plan     Mejia 9/9/2019  She knows her back would do better if she would stop smoking but she is not ready to quit. Lots of stress with job, she is in marital counseling.            Other Visit Diagnoses     Healthcare maintenance    -  Primary          Return in about 6 months (around 3/9/2020).          ALINA Leonardo is a 54 y.o. female being seen in our office today for Annual Exam               Social History  She  reports that she has been smoking cigarettes.  She has a 15.00 pack-year smoking history. She has never used smokeless tobacco. She reports that she drinks alcohol. She reports that she does not use drugs.    History of the Present Illness   HPI Annual Exam: Patient presents for annual exam.  findings; last pap: approximate date 2018 and was normal  Last mammo: approximate date 5/2018 and was normal   The patient is not  still having menses.Last menstrual period: >1 year ago.  History; colonoscopy: Last colonoscopy: colonoscopy 2 years ago with abnormalities.    The patient wears seatbelts: yes.  She is eating a healthy diet.   The patient regularly exercises: yes. She does walking   This patient has ever been tested for HepC: yes    She does see a dentist regularly.   Is she using sunscreen: no  Her immunization are up-to-date.  She is eating a healthy diet for the last two years and doing protein shakes to get the protein.     Significant Past History  The following portions of the patient's history were reviewed and updated as appropriate:PMHroutine: Social history , Past Medical History, Surgical history , Allergies, Current Medications, Active Problem List, Family  History and Health Maintenance    Review of Systems   Constitutional: Negative for activity change, appetite change, chills, fatigue, fever and unexpected weight change.   HENT: Positive for congestion. Negative for ear pain, hearing loss, nosebleeds, rhinorrhea and sore throat.    Eyes: Negative for pain, redness and visual disturbance.   Respiratory: Positive for cough. Negative for shortness of breath and wheezing.    Cardiovascular: Negative for chest pain, palpitations and leg swelling.   Gastrointestinal: Negative for abdominal pain, blood in stool, constipation, diarrhea, nausea and vomiting.   Endocrine: Negative for cold intolerance and heat intolerance.   Genitourinary: Negative for difficulty urinating, dysuria, frequency, hematuria, pelvic pain, urgency and vaginal discharge.   Musculoskeletal: Negative for arthralgias, back pain and joint swelling.   Skin: Negative for rash and wound.   Neurological: Negative for dizziness, weakness, numbness and headaches.   Hematological: Does not bruise/bleed easily.   Psychiatric/Behavioral: Negative for dysphoric mood, sleep disturbance and suicidal ideas. The patient is not nervous/anxious.        OBJECTIVE   Vital Signs          BP Readings from Last 1 Encounters:   09/09/19 122/82     Wt Readings from Last 3 Encounters:   09/09/19 64.5 kg (142 lb 3.2 oz)   11/01/18 69.5 kg (153 lb 3.2 oz)   05/15/18 74.4 kg (164 lb)   Body mass index is 21.62 kg/m².     Physical Exam   Constitutional: She is oriented to person, place, and time. Vital signs are normal. She appears well-developed and well-nourished. No distress.   HENT:   Head: Normocephalic and atraumatic.   Right Ear: Tympanic membrane, external ear and ear canal normal.   Left Ear: Tympanic membrane, external ear and ear canal normal.   Mouth/Throat: Oropharynx is clear and moist.   Eyes: Conjunctivae are normal.   Neck: Normal range of motion. Neck supple. No tracheal deviation present. No thyromegaly present.    Cardiovascular: Normal rate, regular rhythm, normal heart sounds and intact distal pulses.   Occasional insp wheeze   Pulmonary/Chest: Effort normal and breath sounds normal. No respiratory distress. She has no wheezes. She has no rales.   Abdominal: Soft. Bowel sounds are normal. She exhibits no distension. There is no hepatosplenomegaly. There is no tenderness.   Musculoskeletal: She exhibits no edema or deformity.   Lymphadenopathy:     She has no cervical adenopathy.   Neurological: She is alert and oriented to person, place, and time. Gait normal.   Skin: Skin is warm and dry.   Psychiatric: She has a normal mood and affect. Her behavior is normal. Judgment and thought content normal.   Vitals reviewed.    Data Reviewed     Given script for labs to be done at  -- CBC, CMP, lipids

## 2019-09-09 NOTE — ASSESSMENT & PLAN NOTE
Mejia 9/9/2019  She knows her back would do better if she would stop smoking but she is not ready to quit. Lots of stress with job, she is in marital counseling.

## 2019-09-24 RX ORDER — CLONAZEPAM 1 MG/1
0.5 TABLET ORAL 2 TIMES DAILY PRN
Qty: 45 TABLET | Refills: 0 | Status: SHIPPED | OUTPATIENT
Start: 2019-09-24 | End: 2019-11-06 | Stop reason: SDUPTHER

## 2019-09-27 DIAGNOSIS — G43.109 MIGRAINE WITH AURA AND WITHOUT STATUS MIGRAINOSUS, NOT INTRACTABLE: ICD-10-CM

## 2019-09-27 RX ORDER — RIZATRIPTAN BENZOATE 10 MG/1
TABLET ORAL
Qty: 12 TABLET | Refills: 1 | Status: SHIPPED | OUTPATIENT
Start: 2019-09-27 | End: 2020-06-10 | Stop reason: SDUPTHER

## 2019-11-01 ENCOUNTER — TELEPHONE (OUTPATIENT)
Dept: FAMILY MEDICINE CLINIC | Facility: CLINIC | Age: 54
End: 2019-11-01

## 2019-11-01 LAB
CHOLEST SERPL-MCNC: 204 MG/DL
HDLC SERPL-MCNC: 61 MG/DL (ref 40–60)
LDLC SERPL DIRECT ASSAY-MCNC: 125 MG/DL
TRIGL SERPL-MCNC: 92 MG/DL (ref 0–150)

## 2019-11-06 RX ORDER — CLONAZEPAM 1 MG/1
0.5 TABLET ORAL 2 TIMES DAILY PRN
Qty: 45 TABLET | Refills: 0 | Status: SHIPPED | OUTPATIENT
Start: 2019-11-06 | End: 2019-12-13 | Stop reason: SDUPTHER

## 2019-11-14 ENCOUNTER — TELEPHONE (OUTPATIENT)
Dept: FAMILY MEDICINE CLINIC | Facility: CLINIC | Age: 54
End: 2019-11-14

## 2019-12-04 DIAGNOSIS — I10 BENIGN ESSENTIAL HYPERTENSION: ICD-10-CM

## 2019-12-04 RX ORDER — IRBESARTAN 150 MG/1
150 TABLET ORAL DAILY
Qty: 90 TABLET | Refills: 0 | Status: SHIPPED | OUTPATIENT
Start: 2019-12-04 | End: 2020-03-10

## 2019-12-05 RX ORDER — TRAZODONE HYDROCHLORIDE 50 MG/1
50 TABLET ORAL NIGHTLY
Qty: 90 TABLET | Refills: 1 | Status: SHIPPED | OUTPATIENT
Start: 2019-12-05 | End: 2020-06-24

## 2019-12-13 DIAGNOSIS — F41.9 ANXIETY: Primary | ICD-10-CM

## 2019-12-13 RX ORDER — CLONAZEPAM 1 MG/1
TABLET ORAL
Qty: 45 TABLET | Refills: 0 | Status: SHIPPED | OUTPATIENT
Start: 2019-12-13 | End: 2020-02-03 | Stop reason: SDUPTHER

## 2020-02-03 ENCOUNTER — TELEPHONE (OUTPATIENT)
Dept: FAMILY MEDICINE CLINIC | Facility: CLINIC | Age: 55
End: 2020-02-03

## 2020-02-03 DIAGNOSIS — F41.9 ANXIETY: ICD-10-CM

## 2020-02-03 RX ORDER — CLONAZEPAM 1 MG/1
0.5 TABLET ORAL 2 TIMES DAILY PRN
Qty: 45 TABLET | Refills: 0 | Status: SHIPPED | OUTPATIENT
Start: 2020-02-03 | End: 2020-03-12 | Stop reason: SDUPTHER

## 2020-03-10 DIAGNOSIS — I10 BENIGN ESSENTIAL HYPERTENSION: ICD-10-CM

## 2020-03-10 RX ORDER — IRBESARTAN 150 MG/1
150 TABLET ORAL DAILY
Qty: 30 TABLET | Refills: 0 | Status: SHIPPED | OUTPATIENT
Start: 2020-03-10 | End: 2020-04-10 | Stop reason: SDUPTHER

## 2020-03-12 DIAGNOSIS — F41.9 ANXIETY: ICD-10-CM

## 2020-03-12 RX ORDER — CLONAZEPAM 1 MG/1
0.5 TABLET ORAL 2 TIMES DAILY PRN
Qty: 45 TABLET | Refills: 0 | Status: SHIPPED | OUTPATIENT
Start: 2020-03-12 | End: 2020-04-10 | Stop reason: SDUPTHER

## 2020-04-07 DIAGNOSIS — F41.9 ANXIETY: ICD-10-CM

## 2020-04-07 RX ORDER — CLONAZEPAM 1 MG/1
TABLET ORAL
Qty: 45 TABLET | Refills: 0 | Status: CANCELLED | OUTPATIENT
Start: 2020-04-07

## 2020-04-10 ENCOUNTER — TELEMEDICINE (OUTPATIENT)
Dept: FAMILY MEDICINE CLINIC | Facility: CLINIC | Age: 55
End: 2020-04-10

## 2020-04-10 VITALS — DIASTOLIC BLOOD PRESSURE: 80 MMHG | SYSTOLIC BLOOD PRESSURE: 125 MMHG

## 2020-04-10 DIAGNOSIS — I10 BENIGN ESSENTIAL HYPERTENSION: ICD-10-CM

## 2020-04-10 DIAGNOSIS — F41.9 ANXIETY: ICD-10-CM

## 2020-04-10 PROCEDURE — 99213 OFFICE O/P EST LOW 20 MIN: CPT | Performed by: FAMILY MEDICINE

## 2020-04-10 RX ORDER — IRBESARTAN 150 MG/1
150 TABLET ORAL DAILY
Qty: 90 TABLET | Refills: 1 | Status: SHIPPED | OUTPATIENT
Start: 2020-04-10 | End: 2020-09-04

## 2020-04-10 RX ORDER — VARENICLINE TARTRATE 1 MG/1
TABLET, FILM COATED ORAL
COMMUNITY
Start: 2020-01-23 | End: 2020-04-10

## 2020-04-10 RX ORDER — GABAPENTIN 600 MG/1
600 TABLET ORAL 3 TIMES DAILY
COMMUNITY
Start: 2020-03-18

## 2020-04-10 RX ORDER — OXYCODONE HYDROCHLORIDE 5 MG/1
5 TABLET ORAL 3 TIMES DAILY
COMMUNITY
Start: 2020-03-31

## 2020-04-10 RX ORDER — CLONAZEPAM 1 MG/1
0.5 TABLET ORAL 2 TIMES DAILY PRN
Qty: 135 TABLET | Refills: 1 | Status: SHIPPED | OUTPATIENT
Start: 2020-04-10 | End: 2020-11-05

## 2020-04-10 NOTE — PROGRESS NOTES
ASSESSMENT AND PLAN     Problem List Items Addressed This Visit        Cardiovascular and Mediastinum    Benign essential hypertension    Overview     [facility]  BP is controlled well. She will recheck in six months. She will continue present meds.              Other    Anxiety    Overview     [facility] she thinks the Effexor is not working as well as it used to.  She would like to change and I suggested Viibryd but will wait until the COVID crisis is over.               Return in about 6 months (around 10/10/2020).  Patient was given instructions and counseling regarding her condition or for health maintenance advice. Please see specific information pulled into the AVS if appropriate.          SUBJECTIVE  Jaimee Leonardo is a 55 y.o. female being seen in on video today for Hypertension               Social History  She  reports that she quit smoking about 6 months ago. Her smoking use included cigarettes. She has a 15.00 pack-year smoking history. She has never used smokeless tobacco. She reports that she drinks alcohol. She reports that she does not use drugs.    History of the Present Illness   /70 today. Checks regularly. She is still still working. She is trying to stay safe. She is wearing PPE regularly She continues to try to be safe while she is working. BP is good. She is staying busy at home. She is not going out other to care for her horses. Denies CP or shortness of breath. Taking medications as directed.     She quit smoking!!!!!!!!    She is looking at CTS surgery soon. She is waiting for that.    Effexor may not be doing as well as it has. Wants to consider switching agents though not in the middle of the COVID crisis.    Significant Past History  The following portions of the patient's history were reviewed and updated as appropriate:PMHroutine: Social history , Allergies, Current Medications, Active Problem List and Health Maintenance    Review of Systems   Constitutional:  Negative for fatigue, fever and unexpected weight change.   Respiratory: Negative for cough and shortness of breath.    Cardiovascular: Negative for chest pain.   Gastrointestinal: Positive for constipation (re to medications).   Musculoskeletal: Positive for back pain (chronic).   Psychiatric/Behavioral: Negative for dysphoric mood. The patient is not nervous/anxious.      OBJECTIVE  /80     Physical Exam   Constitutional: She appears well-developed and well-nourished.   Pulmonary/Chest: Effort normal.   Psychiatric: She has a normal mood and affect. Her behavior is normal. Judgment and thought content normal.   Vitals reviewed.            The patient has read and signed the [facility] Controlled Substance Contract.  I will continue to see patient for regular follow up appointments. The patient is aware of the potential for addiction and dependence.  She is at low risk for dependence or diversion.

## 2020-04-27 ENCOUNTER — TELEPHONE (OUTPATIENT)
Dept: FAMILY MEDICINE CLINIC | Facility: CLINIC | Age: 55
End: 2020-04-27

## 2020-04-27 DIAGNOSIS — F41.9 ANXIETY: ICD-10-CM

## 2020-04-27 NOTE — TELEPHONE ENCOUNTER
"    Child & Teen Check Up Year 18-20     Health History       Growth Percentile:    Wt Readings from Last 3 Encounters:   10/16/18 179 lb 3.2 oz (81.3 kg) (95 %)*   07/13/18 179 lb (81.2 kg) (95 %)*   07/26/17 187 lb (84.8 kg) (97 %)*     * Growth percentiles are based on CDC 2-20 Years data.      Ht Readings from Last 2 Encounters:   10/16/18 5' 6.53\" (169 cm) (81 %)*   07/13/18 5' 6.14\" (168 cm) (77 %)*     * Growth percentiles are based on CDC 2-20 Years data.    92 %ile based on CDC 2-20 Years BMI-for-age data using vitals from 10/16/2018.    Visit Vitals: Pulse 78  Temp 99.2  F (37.3  C) (Oral)  Resp 16  Ht 5' 6.53\" (169 cm)  Wt 179 lb 3.2 oz (81.3 kg)  LMP 09/14/2018 (Approximate)  SpO2 100%  Breastfeeding? No  BMI 28.46 kg/m2  BP Percentile: No blood pressure reading on file for this encounter.    Informant: Patient    Patient speaks English and so an  was not used.  Family History:   Family History   Problem Relation Age of Onset     Diabetes Mother      Hypertension Mother      Depression Mother      Anxiety Disorder Mother        Dyslipidemia Screening:  Pediatric hyperlipidemia risk factors discussed today: Elevated BMI >85th percentile  Lipid screening performed (recommended if any risk factors): No    Social History:     Did the family/guardian worry about wether their food would run out before they got money to buy more? No  Did the family/guardian find that the food they bought didn't last long enough and they didn't have money to get more?  No    Social History     Social History     Marital status: Single     Spouse name: N/A     Number of children: N/A     Years of education: N/A     Social History Main Topics     Smoking status: Never Smoker     Smokeless tobacco: Never Used     Alcohol use No     Drug use: No     Sexual activity: No     Other Topics Concern     None     Social History Narrative       Medical History: History reviewed. No pertinent past medical " I have fixed this before and I gave a verbal over the phone to the pharmacist.   history.    Family History and past Medical History reviewed and unchanged/updated.    Vision Screen: not done today, saw eye doctor 1 week ago  Parental/or patient concerns: none    Daily Activities: Going to school Monday-Thursday 8am-1pm; Wed-Friday 5 hours shifts at work    Nutrition:    Describe intake: skips breakfast most day and just drinks water; snack/lunch: she'll have a granola bar; when she gets home from class she'll have a bowl of cereal;  dinners together with her Mom and sisters: lasagna or rice and chicken, spaghetti    Recommended adding in vegetables and/or fruits into every meal    Environmental Risks:  TB exposure: No  Guns in house:None    STI Screening:  STI (including HIV) risk behaviors discussed today: Yes  HIV Screening (required once between ages 15-18 yrs): Declined by patient  Other STI screening preformed (recommended if risk factors): No, patient declined    Dental:  Have you been to a dentist this year? No-Verbal referral made for dental check-up       Mental Health:    HEADS SCREENING:    HOME  Do you get along with your parents/siblings? Yes, occasional bickering  Do you have at least one adult you can really talk to? Yes    EDUCATION  Do you have career or college plans after high school? Yes, in [employer] and working now    ACTIVITIES  Do you get some exercise at least 3 times a week? Yes, walks to school every day  Do you feel you are about the right weight for your height? yes    DRUGS  Do you smoke cigarettes or chew tobacco? No  Do you drink alcohol or use any type of drugs? No    SEX  Have you ever had sex? Yes, has been dating this male partner since March. Has sex with men, last interbourse 3 days ago and was unprotected, normally uses condoms    SUICIDE/DEPRESSION  Do you ever feel down or depressed? Yes, occasionally stressed about her future. No suicidal ideations.    SAFETY  Do you feel afraid in any of your relationships? No    Nutrition: Healthy between-meal snacks,  "Safety:  Alcohol/drugs/tobacco use. and Seat belts, helmets. and Guidance:  Birth control, STDs, safer sex. and Stress, nervousness, sadness.       ROS   GENERAL: no recent fevers and activity level has been normal  SKIN: Negative for rash, birthmarks, acne, pigmentation changes  HEENT: Negative for hearing problems, vision problems, nasal congestion, eye discharge and eye redness  RESP: No cough, wheezing, difficulty breathing  CV: No cyanosis, fatigue  GI: Normal stools for age, occasional constipation   : Normal urination, no disharge or painful urination  MS: No swelling, muscle weakness, joint problems  NEURO: Moves all extremeties normally, normal activity for age         Physical Exam:   Pulse 78  Temp 99.2  F (37.3  C) (Oral)  Resp 16  Ht 5' 6.53\" (169 cm)  Wt 179 lb 3.2 oz (81.3 kg)  LMP 09/14/2018 (Approximate)  SpO2 100%  Breastfeeding? No  BMI 28.46 kg/m2    GENERAL: Alert, well nourished, well developed, no acute distress, interacts appropriately for age  SKIN: skin is clear, no rash, acne, abnormal pigmentation or lesions  HEAD: The head is normocephalic.  EYES:The conjunctivae and cornea normal. PERRL, EOMI  EARS: The external auditory canals are clear and the tympanic membranes are normal; gray and transluscent.  NOSE: Clear, no discharge or congestion  MOUTH/THROAT: The throat is clear, tonsils:enlarged, no exudate or lesions. Normal teeth without obvious abnormalities  NECK: The neck is supple and thyroid is normal, no masses  LYMPH NODES: No adenopathy  LUNGS: The lung fields are clear to auscultation,no rales, rhonchi, wheezing or retractions  HEART: Rhythm is regular. S1 and S2 are normal. No murmurs.  ABDOMEN: The bowel sounds are normal. Abdomen soft, non tender,  non distended, no masses or hepatosplenomegaly.  EXTREMITIES: Symmetric extremities, FROM, no deformities. Spine is straight, no scoliosis  NEUROLOGIC: No focal findings. Cranial nerves grossly intact: DTR's normal. Normal " gait, strength and tone         Assessment and Plan   Reason for Visit:   Chief Complaint   Patient presents with     Physical     would like to discuss birth control options. Interested in an IUD or the depo.      Throat Problem     1-2 times a month she has tonsil stones for the past year causing discomfort.     No referrals were made today, but discussed ENT referral in the future for possible tonsilectomy    No concerns on PHQ-9. Routine follow-up.     Immunizations:    Hx immunization reactions?  No  Immunization schedule reviewed: Yes:  Following immunizations advised:  Tdap (if not given when entering 7th grade) Up to date for this immunization  Influenza if in season:Offered and accepted.  Meningococcal (MCV) (If given before age 16 needs a booster at 16+ yo Up to date for this immunization  HPV Vaccine (Gardasil)  recommended for all at age 11 years: Up to date for this immunization    Labs:  Urinalysis: not run today  Hemoglobin: done at 11 years    Schedule next visit in 1 year to renew BC RX. Come in every 3 months for depo shot during window.    Lidia Casas MD

## 2020-04-27 NOTE — TELEPHONE ENCOUNTER
Pharmacy called and stated that the Pt thinks the instructions for her clonazePAM (KlonoPIN) 1 MG tablet is wrong. Pt states she is supposed to take .5 tablet in the morning and 1 tablet at night but that is not what the instructions state. Please advise    Pharmacy callback- [phone]

## 2020-06-10 DIAGNOSIS — G43.109 MIGRAINE WITH AURA AND WITHOUT STATUS MIGRAINOSUS, NOT INTRACTABLE: ICD-10-CM

## 2020-06-10 RX ORDER — RIZATRIPTAN BENZOATE 10 MG/1
TABLET ORAL
Qty: 12 TABLET | Refills: 1 | Status: SHIPPED | OUTPATIENT
Start: 2020-06-10 | End: 2020-12-07

## 2020-06-24 RX ORDER — TRAZODONE HYDROCHLORIDE 50 MG/1
50 TABLET ORAL NIGHTLY
Qty: 90 TABLET | Refills: 1 | Status: SHIPPED | OUTPATIENT
Start: 2020-06-24 | End: 2021-01-18 | Stop reason: SDUPTHER

## 2020-08-04 DIAGNOSIS — F41.9 ANXIETY: ICD-10-CM

## 2020-08-04 RX ORDER — VENLAFAXINE HYDROCHLORIDE 75 MG/1
225 CAPSULE, EXTENDED RELEASE ORAL DAILY
Qty: 90 CAPSULE | Refills: 0 | Status: SHIPPED | OUTPATIENT
Start: 2020-08-04 | End: 2020-09-04

## 2020-09-04 DIAGNOSIS — I10 BENIGN ESSENTIAL HYPERTENSION: ICD-10-CM

## 2020-09-04 DIAGNOSIS — F41.9 ANXIETY: ICD-10-CM

## 2020-09-04 RX ORDER — IRBESARTAN 150 MG/1
150 TABLET ORAL DAILY
Qty: 30 TABLET | Status: CANCELLED | OUTPATIENT
Start: 2020-09-04

## 2020-09-04 RX ORDER — VENLAFAXINE HYDROCHLORIDE 75 MG/1
225 CAPSULE, EXTENDED RELEASE ORAL DAILY
Qty: 90 CAPSULE | Refills: 2 | Status: SHIPPED | OUTPATIENT
Start: 2020-09-04 | End: 2020-12-07

## 2020-09-04 RX ORDER — IRBESARTAN 150 MG/1
TABLET ORAL
Qty: 30 TABLET | Refills: 2 | Status: SHIPPED | OUTPATIENT
Start: 2020-09-04 | End: 2020-12-07

## 2020-11-04 DIAGNOSIS — F41.9 ANXIETY: ICD-10-CM

## 2020-11-05 RX ORDER — CLONAZEPAM 1 MG/1
TABLET ORAL
Qty: 45 TABLET | Refills: 0 | Status: SHIPPED | OUTPATIENT
Start: 2020-11-05 | End: 2020-12-08 | Stop reason: SDUPTHER

## 2020-12-07 DIAGNOSIS — F41.9 ANXIETY: ICD-10-CM

## 2020-12-07 DIAGNOSIS — I10 BENIGN ESSENTIAL HYPERTENSION: ICD-10-CM

## 2020-12-07 DIAGNOSIS — G43.109 MIGRAINE WITH AURA AND WITHOUT STATUS MIGRAINOSUS, NOT INTRACTABLE: ICD-10-CM

## 2020-12-07 RX ORDER — VENLAFAXINE HYDROCHLORIDE 75 MG/1
225 CAPSULE, EXTENDED RELEASE ORAL DAILY
Qty: 90 CAPSULE | Refills: 2 | Status: SHIPPED | OUTPATIENT
Start: 2020-12-07 | End: 2021-03-24

## 2020-12-07 RX ORDER — IRBESARTAN 150 MG/1
150 TABLET ORAL DAILY
Qty: 30 TABLET | Refills: 0 | Status: SHIPPED | OUTPATIENT
Start: 2020-12-07 | End: 2021-01-14 | Stop reason: SDUPTHER

## 2020-12-07 RX ORDER — RIZATRIPTAN BENZOATE 10 MG/1
TABLET ORAL
Qty: 12 TABLET | Refills: 1 | Status: SHIPPED | OUTPATIENT
Start: 2020-12-07 | End: 2022-08-05 | Stop reason: SDUPTHER

## 2020-12-08 DIAGNOSIS — F41.9 ANXIETY: ICD-10-CM

## 2020-12-08 RX ORDER — CLONAZEPAM 1 MG/1
0.5 TABLET ORAL 2 TIMES DAILY PRN
Qty: 45 TABLET | Refills: 0 | Status: SHIPPED | OUTPATIENT
Start: 2020-12-08 | End: 2021-05-28 | Stop reason: SDUPTHER

## 2020-12-08 NOTE — TELEPHONE ENCOUNTER
Caller: Jaimee Leonardo    Relationship: Self    Best call back number: [phone]     Medication needed:   Requested Prescriptions     Pending Prescriptions Disp Refills   • clonazePAM (KlonoPIN) 1 MG tablet 45 tablet 0       When do you need the refill by: ASAP    What details did the patient provide when requesting the medication: Patient said this is quantity 45 but directions should be 1/2 in morning and 1 whole in the evening (1.5 total daily) NOT 1/2 twice a day(1 total daily). So she keeps running out early.l    Does the patient have less than a 3 day supply:  [x] Yes  [] No    What is the patient's preferred pharmacy: [facility] - [address] [phone] [facility] [phone] FX

## 2020-12-16 ENCOUNTER — TELEPHONE (OUTPATIENT)
Dept: FAMILY MEDICINE CLINIC | Facility: CLINIC | Age: 55
End: 2020-12-16

## 2020-12-16 NOTE — TELEPHONE ENCOUNTER
Patient called stating pharmacy still has old instructions for Clonazepam... said this is quantity 45 but directions should be 1/2 in morning and 1 whole in the evening (1.5 total daily) NOT 1/2 twice a day (1 total daily). Pt keeps running out early. Please call the pharmacy and provide new prescription and instructions.    [facility] - [place], [place] - [address]. - [phone]  - [phone] FX

## 2021-01-12 NOTE — TELEPHONE ENCOUNTER
PATIENT STATED THAT THE PHARMACY STILL HAS THE WRONG DOSAGE OF THIS MEDICATION ON FILE, WOULD LIKE TO HAVE VERIFICATION THAT THIS CAN BE FIXED.    PLEASE ADVISE  [phone]     [facility] - [place], [place] - [address] [place] [address] - [phone]  - [phone]   [phone]

## 2021-01-14 ENCOUNTER — TELEMEDICINE (OUTPATIENT)
Dept: FAMILY MEDICINE CLINIC | Facility: CLINIC | Age: 56
End: 2021-01-14

## 2021-01-14 VITALS
DIASTOLIC BLOOD PRESSURE: 82 MMHG | HEIGHT: 67 IN | BODY MASS INDEX: 23.54 KG/M2 | SYSTOLIC BLOOD PRESSURE: 140 MMHG | WEIGHT: 150 LBS

## 2021-01-14 DIAGNOSIS — I10 BENIGN ESSENTIAL HYPERTENSION: ICD-10-CM

## 2021-01-14 PROCEDURE — 99213 OFFICE O/P EST LOW 20 MIN: CPT | Performed by: FAMILY MEDICINE

## 2021-01-14 RX ORDER — IRBESARTAN 150 MG/1
150 TABLET ORAL DAILY
Qty: 90 TABLET | Refills: 1 | Status: SHIPPED | OUTPATIENT
Start: 2021-01-14 | End: 2021-07-15 | Stop reason: SDUPTHER

## 2021-01-14 NOTE — PROGRESS NOTES
Assessment and Plan:  {Problem List  Visit Diagnosis  Prob List Tab  Medications  SmartSets  BestPractice  CC  Quality:23}   Problem List Items Addressed This Visit     Benign essential hypertension    Overview     Mejia 4/10/2020  BP is up a little but she's been having more pain lately. She will continue to monitor. She will recheck in six months. She will continue present meds.           Relevant Medications    irbesartan (AVAPRO) 150 MG tablet        No follow-ups on file.  Patient was given instructions and counseling regarding her condition or for health maintenance advice. Please see specific information pulled into the AVS if appropriate.        Jaimee Leonardo is a 55 y.o. female being seen today for Hypertension   Subjective   History of the Present Illness   She's been off work with her back since November 18, 2020, she did a pain pump and had some sort of weird reaction to that. She got itching with dilaudid and fentanyl. It really helped. She got a csf leak with that. Had an MRI. We didn't get those results. Seeing Dr. Rowan's office. 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 They are going to try a pic line. Ketamine trial. She's on short term disability with work. That will go into long term disability. She also has neuropathy. So she is going to try to get regular disability.   Social History  She  reports that she quit smoking about 16 months ago. Her smoking use included cigarettes. She has a 15.00 pack-year smoking history. She has never used smokeless tobacco. She reports current alcohol use. She reports that she does not use drugs.  Objective   Vital Signs          BP Readings from Last 1 Encounters:   01/14/21 140/82     Wt Readings from Last 3 Encounters:   01/14/21 68 kg (150 lb)   09/09/19 64.5 kg (142 lb 3.2 oz)   11/01/18 69.5 kg (153 lb 3.2 oz)   Body mass index is 23.49 kg/m².     Physical Exam  Vitals signs reviewed.   Constitutional:       Appearance: Normal appearance. She is  well-developed.   Neurological:      Mental Status: She is alert.   Psychiatric:         Behavior: Behavior normal.         Thought Content: Thought content normal.         Judgment: Judgment normal.               Patient chose to receive care through a telehealth visit.  She consents to use a video/audio connection for her medical care today.

## 2021-01-18 DIAGNOSIS — F41.9 ANXIETY: ICD-10-CM

## 2021-01-18 DIAGNOSIS — F51.01 PRIMARY INSOMNIA: Primary | ICD-10-CM

## 2021-01-18 RX ORDER — CLONAZEPAM 0.5 MG/1
TABLET ORAL
Qty: 60 TABLET | Refills: 0 | Status: SHIPPED | OUTPATIENT
Start: 2021-01-18 | End: 2021-04-14 | Stop reason: SDUPTHER

## 2021-01-18 RX ORDER — TRAZODONE HYDROCHLORIDE 50 MG/1
50 TABLET ORAL NIGHTLY
Qty: 90 TABLET | Refills: 1 | Status: SHIPPED | OUTPATIENT
Start: 2021-01-18 | End: 2021-07-15 | Stop reason: SDUPTHER

## 2021-01-18 NOTE — TELEPHONE ENCOUNTER
Patient called asking to  scripts for Trazodone and Clonazepam due to it being sent to pharmacy yet they do not have them directions on clonazepam need to be changed to half tab in the morning and 1 tablet in the evening I tried changing to that but it keeps the old directions with it can you change it and print them both for

## 2021-03-24 DIAGNOSIS — F41.9 ANXIETY: ICD-10-CM

## 2021-03-24 RX ORDER — VENLAFAXINE HYDROCHLORIDE 75 MG/1
CAPSULE, EXTENDED RELEASE ORAL
Qty: 270 CAPSULE | Refills: 2 | Status: SHIPPED | OUTPATIENT
Start: 2021-03-24 | End: 2021-11-15

## 2021-04-05 PROBLEM — Z95.828 S/P PICC CENTRAL LINE PLACEMENT: Status: ACTIVE | Noted: 2021-04-05

## 2021-04-14 DIAGNOSIS — F41.9 ANXIETY: ICD-10-CM

## 2021-04-14 DIAGNOSIS — F51.01 PRIMARY INSOMNIA: ICD-10-CM

## 2021-04-14 RX ORDER — CLONAZEPAM 1 MG/1
0.5 TABLET ORAL 2 TIMES DAILY PRN
Qty: 45 TABLET | Refills: 0 | OUTPATIENT
Start: 2021-04-14

## 2021-04-14 RX ORDER — CLONAZEPAM 0.5 MG/1
TABLET ORAL
Qty: 60 TABLET | Refills: 0 | Status: SHIPPED | OUTPATIENT
Start: 2021-04-14 | End: 2021-07-15 | Stop reason: SDUPTHER

## 2021-04-14 NOTE — TELEPHONE ENCOUNTER
Caller: Jorden Jaimee    Relationship: Self    Best call back number: [phone]     Medication needed:   Requested Prescriptions     Pending Prescriptions Disp Refills   • clonazePAM (KlonoPIN) 0.5 MG tablet 60 tablet 0     Si.5 tab in am, 1 tab in pm   • clonazePAM (KlonoPIN) 1 MG tablet 45 tablet 0     Sig: Take 0.5 tablets by mouth 2 (Two) Times a Day As Needed for Anxiety.       When do you need the refill by: 21    Does the patient have less than a 3 day supply:  [x] Yes  [] No    What is the patient's preferred pharmacy: [facility] - [address] [phone] [facility] [phone] FX

## 2021-05-28 DIAGNOSIS — F41.9 ANXIETY: ICD-10-CM

## 2021-05-28 RX ORDER — CLONAZEPAM 1 MG/1
0.5 TABLET ORAL 2 TIMES DAILY PRN
Qty: 45 TABLET | Refills: 0 | Status: SHIPPED | OUTPATIENT
Start: 2021-05-28 | End: 2021-07-15

## 2021-05-28 NOTE — TELEPHONE ENCOUNTER
Caller: Jaimee Leonardo    Relationship: Self    Best call back number: [phone]    Medication needed:   Requested Prescriptions     Pending Prescriptions Disp Refills   • clonazePAM (KlonoPIN) 1 MG tablet 45 tablet 0     Sig: Take 0.5 tablets by mouth 2 (Two) Times a Day As Needed for Anxiety.       When do you need the refill by: ASAP    Does the patient have less than a 3 day supply:  [x] Yes  [] No    What is the patient's preferred pharmacy: [facility] - [address] [phone] [facility] [phone] FX

## 2021-07-15 DIAGNOSIS — I10 BENIGN ESSENTIAL HYPERTENSION: ICD-10-CM

## 2021-07-15 DIAGNOSIS — F41.9 ANXIETY: ICD-10-CM

## 2021-07-15 DIAGNOSIS — F51.01 PRIMARY INSOMNIA: ICD-10-CM

## 2021-07-15 RX ORDER — CLONAZEPAM 1 MG/1
0.5 TABLET ORAL 2 TIMES DAILY PRN
Qty: 45 TABLET | Refills: 0 | Status: CANCELLED | OUTPATIENT
Start: 2021-07-15

## 2021-07-15 RX ORDER — TRAZODONE HYDROCHLORIDE 50 MG/1
50 TABLET ORAL NIGHTLY
Qty: 90 TABLET | Refills: 1 | Status: SHIPPED | OUTPATIENT
Start: 2021-07-15 | End: 2022-01-06

## 2021-07-15 RX ORDER — CLONAZEPAM 0.5 MG/1
TABLET ORAL
Qty: 60 TABLET | Refills: 0 | Status: CANCELLED | OUTPATIENT
Start: 2021-07-15

## 2021-07-15 RX ORDER — CLONAZEPAM 0.5 MG/1
TABLET ORAL
Qty: 45 TABLET | Refills: 0 | Status: SHIPPED | OUTPATIENT
Start: 2021-07-15 | End: 2021-08-25

## 2021-07-15 RX ORDER — IRBESARTAN 150 MG/1
150 TABLET ORAL DAILY
Qty: 90 TABLET | Refills: 1 | Status: SHIPPED | OUTPATIENT
Start: 2021-07-15 | End: 2022-01-06

## 2021-07-15 NOTE — TELEPHONE ENCOUNTER
Caller: Jaimee Leonardo    Relationship: Self    Best call back number: [phone]    Medication needed:   Requested Prescriptions     Pending Prescriptions Disp Refills   • irbesartan (AVAPRO) 150 MG tablet 90 tablet 1     Sig: Take 1 tablet by mouth Daily.   • clonazePAM (KlonoPIN) 1 MG tablet 45 tablet 0     Sig: Take 0.5 tablets by mouth 2 (Two) Times a Day As Needed for Anxiety.   • clonazePAM (KlonoPIN) 0.5 MG tablet 60 tablet 0     Si.5 tab in am, 1 tab in pm   • traZODone (DESYREL) 50 MG tablet 90 tablet 1     Sig: Take 1 tablet by mouth Every Night.       When do you need the refill by: ASAP    What additional details did the patient provide when requesting the medication: PATIENT HAS LESS THAN 3 DOSES.     Does the patient have less than a 3 day supply:  [x] Yes  [] No    What is the patient's preferred pharmacy: [facility] [address] [place] [address] AT [facility] [address]  [phone] [facility] [phone] FX         
Last visit 1/14/21  Next visit 8/2/21  
Please fix this. She takes a 1/2 tab in the am and a whole tab in the evening. She gets ONE prescription of clonazepam, not two.   
Statement Selected

## 2021-08-05 NOTE — PROGRESS NOTES
Assessment and Plan     Problem List Items Addressed This Visit     Benign essential hypertension    Overview     Mejia 8/6/2021  BP is controlled well. She will recheck in six months. She will continue present meds.           Relevant Medications    EPINEPHrine (EpiPen 2-Uri) 0.3 MG/0.3ML solution auto-injector injection      Other Visit Diagnoses     Healthcare maintenance    -  Primary    Encounter for screening for other disorder        Relevant Orders    CBC (No Diff)    Comprehensive Metabolic Panel    Lipid Panel With LDL / HDL Ratio    Screening for malignant neoplasm of the cervix        Relevant Orders    IGP, Aptima HPV, Rfx 16 / 18,45      Patient will need colonoscopy once she gets her medicare.   Patient was given instructions and counseling regarding her condition or for health maintenance advice. Please see specific information pulled into the AVS if appropriate.        Jaimee is a 56 y.o. being seen today for  Annual Exam   Subjective   History of the Present Illness   Annual Exam-Postmenopausal:     Jaimee Leonardo 56 y.o.female presents for annual exam.     Last pap: approximate date 2018 and was normal  Last mammogramapproximate date 2020 and was normal  The patient is not taking hormone replacement therapy.   The patient wears seatbelts: yes.    The patient has regular exercise: in her pool -- some.     Practicing social distancing, handwashing and keeping hands from face? yes  This patient has ever been tested for HepC: yes .   Dental Exam. needs to work on this, but she does take good care of her teeth.   history; colonoscopy/sigmoidoscopy: colonoscopy 5 years ago with abnormalities -- polyps -- UL  Her immunization are not up-to-date but will get shingrix today.   She is eating a healthy diet.   Labs results were ordered     Social History  She  reports that she quit smoking about 22 months ago. Her smoking use included cigarettes. She has a 15.00 pack-year smoking history. She has  never used smokeless tobacco. She reports current alcohol use. She reports that she does not use drugs.  Objective   Vital Signs        BP Readings from Last 1 Encounters:   08/06/21 118/70     Wt Readings from Last 3 Encounters:   08/06/21 73 kg (161 lb)   01/14/21 68 kg (150 lb)   09/09/19 64.5 kg (142 lb 3.2 oz)   Body mass index is 25.22 kg/m².     Physical Exam  Vitals reviewed.   Constitutional:       General: She is not in acute distress.     Appearance: She is well-developed.   HENT:      Head: Normocephalic and atraumatic.      Right Ear: Tympanic membrane, ear canal and external ear normal.      Left Ear: Tympanic membrane, ear canal and external ear normal.      Mouth/Throat:      Comments: Mask in place  Eyes:      Conjunctiva/sclera: Conjunctivae normal.   Neck:      Thyroid: No thyromegaly.      Trachea: No tracheal deviation.   Cardiovascular:      Rate and Rhythm: Normal rate and regular rhythm.      Heart sounds: Normal heart sounds. No murmur heard.   No gallop.    Pulmonary:      Effort: Pulmonary effort is normal. No respiratory distress.      Breath sounds: Normal breath sounds. No wheezing or rales.   Chest:      Chest wall: No tenderness.      Breasts: Breasts are symmetrical.         Right: No mass, nipple discharge or skin change.         Left: No mass, nipple discharge or skin change.   Abdominal:      General: Bowel sounds are normal. There is no distension.      Palpations: Abdomen is soft.      Tenderness: There is no abdominal tenderness.   Genitourinary:     Exam position: Supine.      Labia:         Right: No rash or lesion.         Left: No rash or lesion.       Vagina: Normal. No vaginal discharge, erythema, tenderness or bleeding.      Cervix: No cervical motion tenderness, discharge or friability.      Uterus: Not enlarged and not tender.       Adnexa:         Right: No mass, tenderness or fullness.          Left: No mass, tenderness or fullness.     Musculoskeletal:          General: No deformity.      Cervical back: Normal range of motion and neck supple.      Right lower leg: No edema.      Left lower leg: No edema.   Lymphadenopathy:      Cervical: No cervical adenopathy.   Skin:     General: Skin is warm and dry.      Findings: No rash.   Neurological:      Mental Status: She is alert and oriented to person, place, and time.   Psychiatric:         Behavior: Behavior normal.         Thought Content: Thought content normal.         Judgment: Judgment normal.

## 2021-08-06 ENCOUNTER — TELEMEDICINE (OUTPATIENT)
Dept: FAMILY MEDICINE CLINIC | Facility: CLINIC | Age: 56
End: 2021-08-06

## 2021-08-06 VITALS
WEIGHT: 161 LBS | HEART RATE: 70 BPM | SYSTOLIC BLOOD PRESSURE: 118 MMHG | OXYGEN SATURATION: 98 % | BODY MASS INDEX: 25.27 KG/M2 | RESPIRATION RATE: 16 BRPM | DIASTOLIC BLOOD PRESSURE: 70 MMHG | HEIGHT: 67 IN

## 2021-08-06 DIAGNOSIS — Z12.4 SCREENING FOR MALIGNANT NEOPLASM OF THE CERVIX: ICD-10-CM

## 2021-08-06 DIAGNOSIS — Z13.89 ENCOUNTER FOR SCREENING FOR OTHER DISORDER: ICD-10-CM

## 2021-08-06 DIAGNOSIS — Z00.00 HEALTHCARE MAINTENANCE: Primary | ICD-10-CM

## 2021-08-06 DIAGNOSIS — I10 BENIGN ESSENTIAL HYPERTENSION: ICD-10-CM

## 2021-08-06 PROCEDURE — 99396 PREV VISIT EST AGE 40-64: CPT | Performed by: FAMILY MEDICINE

## 2021-08-06 RX ORDER — EPINEPHRINE 0.3 MG/.3ML
0.3 INJECTION SUBCUTANEOUS ONCE
Qty: 1 EACH | Refills: 0 | Status: SHIPPED | OUTPATIENT
Start: 2021-08-06 | End: 2021-08-06

## 2021-08-07 LAB
ALBUMIN SERPL-MCNC: 4.8 G/DL (ref 3.8–4.9)
ALBUMIN/GLOB SERPL: 2.2 {RATIO} (ref 1.2–2.2)
ALP SERPL-CCNC: 92 IU/L (ref 48–121)
ALT SERPL-CCNC: 13 IU/L (ref 0–32)
AST SERPL-CCNC: 21 IU/L (ref 0–40)
BILIRUB SERPL-MCNC: <0.2 MG/DL (ref 0–1.2)
BUN SERPL-MCNC: 11 MG/DL (ref 6–24)
BUN/CREAT SERPL: 17 (ref 9–23)
CALCIUM SERPL-MCNC: 9.4 MG/DL (ref 8.7–10.2)
CHLORIDE SERPL-SCNC: 97 MMOL/L (ref 96–106)
CHOLEST SERPL-MCNC: 231 MG/DL (ref 100–199)
CO2 SERPL-SCNC: 26 MMOL/L (ref 20–29)
CREAT SERPL-MCNC: 0.66 MG/DL (ref 0.57–1)
ERYTHROCYTE [DISTWIDTH] IN BLOOD BY AUTOMATED COUNT: 12.5 % (ref 11.7–15.4)
GLOBULIN SER CALC-MCNC: 2.2 G/DL (ref 1.5–4.5)
GLUCOSE SERPL-MCNC: 99 MG/DL (ref 65–99)
HCT VFR BLD AUTO: 38.9 % (ref 34–46.6)
HDLC SERPL-MCNC: 75 MG/DL
HGB BLD-MCNC: 13 G/DL (ref 11.1–15.9)
LDLC SERPL CALC-MCNC: 147 MG/DL (ref 0–99)
LDLC/HDLC SERPL: 2 RATIO (ref 0–3.2)
MCH RBC QN AUTO: 29.9 PG (ref 26.6–33)
MCHC RBC AUTO-ENTMCNC: 33.4 G/DL (ref 31.5–35.7)
MCV RBC AUTO: 89 FL (ref 79–97)
PLATELET # BLD AUTO: 312 X10E3/UL (ref 150–450)
POTASSIUM SERPL-SCNC: 5.2 MMOL/L (ref 3.5–5.2)
PROT SERPL-MCNC: 7 G/DL (ref 6–8.5)
RBC # BLD AUTO: 4.35 X10E6/UL (ref 3.77–5.28)
SODIUM SERPL-SCNC: 134 MMOL/L (ref 134–144)
TRIGL SERPL-MCNC: 56 MG/DL (ref 0–149)
VLDLC SERPL CALC-MCNC: 9 MG/DL (ref 5–40)
WBC # BLD AUTO: 5.4 X10E3/UL (ref 3.4–10.8)

## 2021-08-10 LAB
CYTOLOGIST CVX/VAG CYTO: NORMAL
CYTOLOGY CVX/VAG DOC CYTO: NORMAL
CYTOLOGY CVX/VAG DOC THIN PREP: NORMAL
DX ICD CODE: NORMAL
HIV 1 & 2 AB SER-IMP: NORMAL
HPV I/H RISK 4 DNA CVX QL PROBE+SIG AMP: NEGATIVE
OTHER STN SPEC: NORMAL
STAT OF ADQ CVX/VAG CYTO-IMP: NORMAL

## 2021-08-24 DIAGNOSIS — F51.01 PRIMARY INSOMNIA: ICD-10-CM

## 2021-08-24 DIAGNOSIS — F41.9 ANXIETY: ICD-10-CM

## 2021-08-25 RX ORDER — CLONAZEPAM 0.5 MG/1
TABLET ORAL
Qty: 45 TABLET | Refills: 1 | Status: SHIPPED | OUTPATIENT
Start: 2021-08-25 | End: 2021-11-01

## 2021-10-31 DIAGNOSIS — F41.9 ANXIETY: ICD-10-CM

## 2021-10-31 DIAGNOSIS — F51.01 PRIMARY INSOMNIA: ICD-10-CM

## 2021-11-03 RX ORDER — CLONAZEPAM 0.5 MG/1
TABLET ORAL
Qty: 45 TABLET | Refills: 0 | Status: SHIPPED | OUTPATIENT
Start: 2021-11-03 | End: 2021-12-03

## 2021-11-14 DIAGNOSIS — F41.9 ANXIETY: ICD-10-CM

## 2021-11-15 RX ORDER — VENLAFAXINE HYDROCHLORIDE 75 MG/1
CAPSULE, EXTENDED RELEASE ORAL
Qty: 270 CAPSULE | Refills: 2 | Status: SHIPPED | OUTPATIENT
Start: 2021-11-15 | End: 2022-10-13

## 2021-12-03 DIAGNOSIS — F51.01 PRIMARY INSOMNIA: ICD-10-CM

## 2021-12-03 DIAGNOSIS — F41.9 ANXIETY: ICD-10-CM

## 2021-12-03 RX ORDER — CLONAZEPAM 0.5 MG/1
TABLET ORAL
Qty: 45 TABLET | Refills: 0 | Status: SHIPPED | OUTPATIENT
Start: 2021-12-03 | End: 2022-01-06

## 2022-01-06 DIAGNOSIS — F51.01 PRIMARY INSOMNIA: ICD-10-CM

## 2022-01-06 DIAGNOSIS — F41.9 ANXIETY: ICD-10-CM

## 2022-01-06 DIAGNOSIS — I10 BENIGN ESSENTIAL HYPERTENSION: ICD-10-CM

## 2022-01-06 RX ORDER — TRAZODONE HYDROCHLORIDE 50 MG/1
TABLET ORAL
Qty: 90 TABLET | Refills: 1 | Status: SHIPPED | OUTPATIENT
Start: 2022-01-06 | End: 2022-06-02

## 2022-01-06 RX ORDER — CLONAZEPAM 0.5 MG/1
TABLET ORAL
Qty: 45 TABLET | Refills: 0 | Status: SHIPPED | OUTPATIENT
Start: 2022-01-06 | End: 2022-02-15

## 2022-01-06 RX ORDER — IRBESARTAN 150 MG/1
TABLET ORAL
Qty: 90 TABLET | Refills: 1 | Status: SHIPPED | OUTPATIENT
Start: 2022-01-06 | End: 2022-05-31

## 2022-01-06 NOTE — TELEPHONE ENCOUNTER
Last office visit: 8/6/21    Next office visit: Not scheduled (due for annual 8/2022)    Last refill: 12/3/21

## 2022-02-15 DIAGNOSIS — F41.9 ANXIETY: ICD-10-CM

## 2022-02-15 DIAGNOSIS — F51.01 PRIMARY INSOMNIA: ICD-10-CM

## 2022-02-15 RX ORDER — CLONAZEPAM 0.5 MG/1
TABLET ORAL
Qty: 45 TABLET | Refills: 0 | Status: SHIPPED | OUTPATIENT
Start: 2022-02-15 | End: 2022-03-21

## 2022-03-21 DIAGNOSIS — F41.9 ANXIETY: ICD-10-CM

## 2022-03-21 DIAGNOSIS — F51.01 PRIMARY INSOMNIA: ICD-10-CM

## 2022-03-21 RX ORDER — CLONAZEPAM 0.5 MG/1
TABLET ORAL
Qty: 45 TABLET | Refills: 0 | Status: SHIPPED | OUTPATIENT
Start: 2022-03-21 | End: 2022-04-21

## 2022-04-21 DIAGNOSIS — F41.9 ANXIETY: ICD-10-CM

## 2022-04-21 DIAGNOSIS — F51.01 PRIMARY INSOMNIA: ICD-10-CM

## 2022-04-22 RX ORDER — CLONAZEPAM 0.5 MG/1
TABLET ORAL
Qty: 45 TABLET | Refills: 0 | Status: SHIPPED | OUTPATIENT
Start: 2022-04-22 | End: 2022-05-20

## 2022-05-20 DIAGNOSIS — F41.9 ANXIETY: ICD-10-CM

## 2022-05-20 DIAGNOSIS — F51.01 PRIMARY INSOMNIA: ICD-10-CM

## 2022-05-20 RX ORDER — CLONAZEPAM 0.5 MG/1
TABLET ORAL
Qty: 45 TABLET | Refills: 0 | Status: SHIPPED | OUTPATIENT
Start: 2022-05-20 | End: 2022-06-30

## 2022-05-31 DIAGNOSIS — I10 BENIGN ESSENTIAL HYPERTENSION: ICD-10-CM

## 2022-05-31 RX ORDER — IRBESARTAN 150 MG/1
TABLET ORAL
Qty: 30 TABLET | Refills: 0 | Status: SHIPPED | OUTPATIENT
Start: 2022-05-31 | End: 2022-06-30

## 2022-06-02 RX ORDER — TRAZODONE HYDROCHLORIDE 50 MG/1
50 TABLET ORAL
Qty: 30 TABLET | Refills: 0 | Status: SHIPPED | OUTPATIENT
Start: 2022-06-02 | End: 2022-07-05

## 2022-06-30 DIAGNOSIS — F41.9 ANXIETY: ICD-10-CM

## 2022-06-30 DIAGNOSIS — I10 BENIGN ESSENTIAL HYPERTENSION: ICD-10-CM

## 2022-06-30 DIAGNOSIS — F51.01 PRIMARY INSOMNIA: ICD-10-CM

## 2022-06-30 RX ORDER — CLONAZEPAM 0.5 MG/1
TABLET ORAL
Qty: 45 TABLET | Refills: 0 | Status: SHIPPED | OUTPATIENT
Start: 2022-06-30 | End: 2022-08-05 | Stop reason: SDUPTHER

## 2022-06-30 RX ORDER — IRBESARTAN 150 MG/1
TABLET ORAL
Qty: 15 TABLET | Refills: 0 | Status: SHIPPED | OUTPATIENT
Start: 2022-06-30 | End: 2022-08-05 | Stop reason: SDUPTHER

## 2022-07-05 RX ORDER — TRAZODONE HYDROCHLORIDE 50 MG/1
TABLET ORAL
Qty: 15 TABLET | Refills: 0 | Status: SHIPPED | OUTPATIENT
Start: 2022-07-05 | End: 2022-08-05 | Stop reason: SDUPTHER

## 2022-08-04 NOTE — PROGRESS NOTES
Assessment and Plan     Patient Instructions    Problem List Items Addressed This Visit        Cardiac and Vasculature    Benign essential hypertension - Primary    Overview     Mejia 8/5/2022  BP is controlled well. She will recheck in six months. She will continue present meds.           Relevant Medications    irbesartan (AVAPRO) 150 MG tablet       Mental Health    Anxiety    Overview     Mary 4/10/2020 she thinks the Effexor is not working as well as it used to.  She would like to change and I suggested Viibryd but will wait until the COVID crisis is over.           Relevant Medications    clonazePAM (KlonoPIN) 0.5 MG tablet       Neuro    Migraine with aura and without status migrainosus, not intractable    Relevant Medications    rizatriptan (MAXALT) 10 MG tablet    clonazePAM (KlonoPIN) 0.5 MG tablet    traZODone (DESYREL) 50 MG tablet       Sleep    Insomnia    Overview     Mary 11/28/2017  Gets about four hours of sleep at night.          Relevant Medications    clonazePAM (KlonoPIN) 0.5 MG tablet    traZODone (DESYREL) 50 MG tablet      Other Visit Diagnoses     Need for vaccination        Relevant Orders    COVID-19 Vaccine (Pfizer) Gray Cap (Completed)                    Jaimee is a 57 y.o. being seen today for  Anxiety and Hypertension   Subjective   History of the Present Illness   No complaints.  Taking meds as ordered.  No headache, chest pain or shortness of breath.  She has not been able to get Medicare and has a court date. Neuropathy in her feet and she is falling more often. Her foot will go out without notice. She is having pins and needs and pain from her cervical area.     Got swarmed with yellow jackets.   Social History  She  reports that she quit smoking about 2 years ago. Her smoking use included cigarettes. She has a 15.00 pack-year smoking history. She has never used smokeless tobacco. She reports current alcohol use. She reports that she does not use drugs.  Objective    Vital Signs        BP Readings from Last 1 Encounters:   08/05/22 128/80     Wt Readings from Last 3 Encounters:   08/05/22 72.9 kg (160 lb 12.8 oz)   08/06/21 73 kg (161 lb)   01/14/21 68 kg (150 lb)   Body mass index is 25.18 kg/m².     Physical Exam  Vitals reviewed.   Constitutional:       Appearance: Normal appearance. She is well-developed and normal weight.      Comments: Mask in place    Cardiovascular:      Rate and Rhythm: Normal rate and regular rhythm.      Heart sounds: Normal heart sounds.   Pulmonary:      Effort: Pulmonary effort is normal.      Breath sounds: Normal breath sounds.   Neurological:      Mental Status: She is alert.   Psychiatric:         Behavior: Behavior normal.         Thought Content: Thought content normal.         Judgment: Judgment normal.               Patient was given instructions and counseling regarding her condition or for health maintenance advice. Please see specific information pulled into the AVS if appropriate.

## 2022-08-05 ENCOUNTER — OFFICE VISIT (OUTPATIENT)
Dept: FAMILY MEDICINE CLINIC | Facility: CLINIC | Age: 57
End: 2022-08-05

## 2022-08-05 VITALS
DIASTOLIC BLOOD PRESSURE: 80 MMHG | BODY MASS INDEX: 25.24 KG/M2 | RESPIRATION RATE: 18 BRPM | SYSTOLIC BLOOD PRESSURE: 128 MMHG | HEIGHT: 67 IN | OXYGEN SATURATION: 99 % | HEART RATE: 67 BPM | WEIGHT: 160.8 LBS

## 2022-08-05 DIAGNOSIS — G43.109 MIGRAINE WITH AURA AND WITHOUT STATUS MIGRAINOSUS, NOT INTRACTABLE: ICD-10-CM

## 2022-08-05 DIAGNOSIS — F41.9 ANXIETY: ICD-10-CM

## 2022-08-05 DIAGNOSIS — F51.01 PRIMARY INSOMNIA: ICD-10-CM

## 2022-08-05 DIAGNOSIS — Z23 NEED FOR VACCINATION: ICD-10-CM

## 2022-08-05 DIAGNOSIS — I10 BENIGN ESSENTIAL HYPERTENSION: Primary | ICD-10-CM

## 2022-08-05 PROCEDURE — 0051A COVID-19 (PFIZER) 12+ YRS: CPT | Performed by: FAMILY MEDICINE

## 2022-08-05 PROCEDURE — 99214 OFFICE O/P EST MOD 30 MIN: CPT | Performed by: FAMILY MEDICINE

## 2022-08-05 PROCEDURE — 91305 COVID-19 (PFIZER) 12+ YRS: CPT | Performed by: FAMILY MEDICINE

## 2022-08-05 RX ORDER — IRBESARTAN 150 MG/1
150 TABLET ORAL DAILY
Qty: 90 TABLET | Refills: 1 | Status: SHIPPED | OUTPATIENT
Start: 2022-08-05 | End: 2023-02-13 | Stop reason: SDUPTHER

## 2022-08-05 RX ORDER — RIZATRIPTAN BENZOATE 10 MG/1
TABLET ORAL
Qty: 12 TABLET | Refills: 1 | Status: SHIPPED | OUTPATIENT
Start: 2022-08-05 | End: 2023-01-20

## 2022-08-05 RX ORDER — TRAZODONE HYDROCHLORIDE 50 MG/1
50 TABLET ORAL NIGHTLY
Qty: 90 TABLET | Refills: 2 | Status: SHIPPED | OUTPATIENT
Start: 2022-08-05

## 2022-08-05 RX ORDER — CLONAZEPAM 0.5 MG/1
TABLET ORAL
Qty: 45 TABLET | Refills: 0 | Status: SHIPPED | OUTPATIENT
Start: 2022-08-05 | End: 2022-09-13

## 2022-09-12 DIAGNOSIS — F41.9 ANXIETY: ICD-10-CM

## 2022-09-12 DIAGNOSIS — F51.01 PRIMARY INSOMNIA: ICD-10-CM

## 2022-09-13 RX ORDER — CLONAZEPAM 0.5 MG/1
TABLET ORAL
Qty: 45 TABLET | Refills: 1 | Status: SHIPPED | OUTPATIENT
Start: 2022-09-13 | End: 2022-11-15

## 2022-10-13 DIAGNOSIS — F41.9 ANXIETY: ICD-10-CM

## 2022-10-13 RX ORDER — VENLAFAXINE HYDROCHLORIDE 75 MG/1
CAPSULE, EXTENDED RELEASE ORAL
Qty: 270 CAPSULE | Refills: 2 | Status: SHIPPED | OUTPATIENT
Start: 2022-10-13

## 2022-11-15 DIAGNOSIS — F51.01 PRIMARY INSOMNIA: ICD-10-CM

## 2022-11-15 DIAGNOSIS — F41.9 ANXIETY: ICD-10-CM

## 2022-11-15 RX ORDER — CLONAZEPAM 0.5 MG/1
TABLET ORAL
Qty: 45 TABLET | Refills: 0 | Status: SHIPPED | OUTPATIENT
Start: 2022-11-15 | End: 2022-12-14

## 2022-12-13 DIAGNOSIS — F51.01 PRIMARY INSOMNIA: ICD-10-CM

## 2022-12-13 DIAGNOSIS — F41.9 ANXIETY: ICD-10-CM

## 2022-12-14 RX ORDER — CLONAZEPAM 0.5 MG/1
TABLET ORAL
Qty: 45 TABLET | Refills: 0 | Status: SHIPPED | OUTPATIENT
Start: 2022-12-14 | End: 2023-01-25 | Stop reason: SDUPTHER

## 2023-01-19 DIAGNOSIS — G43.109 MIGRAINE WITH AURA AND WITHOUT STATUS MIGRAINOSUS, NOT INTRACTABLE: ICD-10-CM

## 2023-01-20 RX ORDER — RIZATRIPTAN BENZOATE 10 MG/1
TABLET ORAL
Qty: 12 TABLET | Refills: 1 | Status: SHIPPED | OUTPATIENT
Start: 2023-01-20

## 2023-01-25 DIAGNOSIS — F41.9 ANXIETY: ICD-10-CM

## 2023-01-25 DIAGNOSIS — F51.01 PRIMARY INSOMNIA: ICD-10-CM

## 2023-01-25 NOTE — TELEPHONE ENCOUNTER
Caller: Jorden Jaimee K    Relationship: Self    Best call back number: [phone]*    Requested Prescriptions:   Requested Prescriptions     Pending Prescriptions Disp Refills   • clonazePAM (KlonoPIN) 0.5 MG tablet 45 tablet 0     Sig: TAKE ONE HALF TABLET BY MOUTH EVERY MORNING AND THEN TAKE TAKE ONE TABLET BY MOUTH EVERY EVENING        Pharmacy where request should be sent: [facility] [id number] [place], [place] - [address] AT [facility] ( [address] & [facility] [phone] [facility] [phone] FX     Additional details provided by patient: PATIENT STATES SHE IS COMPLETELY OUT OF THIS MEDICATION    Does the patient have less than a 3 day supply:  [x] Yes  [] No    Would you like a call back once the refill request has been completed: [] Yes [x] No    If the office needs to give you a call back, can they leave a voicemail: [] Yes [x] No    Candice Jones   01/25/23 15:29 EST

## 2023-01-26 RX ORDER — CLONAZEPAM 0.5 MG/1
TABLET ORAL
Qty: 45 TABLET | Refills: 0 | Status: SHIPPED | OUTPATIENT
Start: 2023-01-26 | End: 2023-02-28

## 2023-02-13 ENCOUNTER — OFFICE VISIT (OUTPATIENT)
Dept: FAMILY MEDICINE CLINIC | Facility: CLINIC | Age: 58
End: 2023-02-13
Payer: COMMERCIAL

## 2023-02-13 VITALS
WEIGHT: 161 LBS | DIASTOLIC BLOOD PRESSURE: 82 MMHG | BODY MASS INDEX: 25.27 KG/M2 | SYSTOLIC BLOOD PRESSURE: 120 MMHG | HEIGHT: 67 IN | HEART RATE: 74 BPM | OXYGEN SATURATION: 94 % | RESPIRATION RATE: 18 BRPM

## 2023-02-13 DIAGNOSIS — Z12.31 ENCOUNTER FOR SCREENING MAMMOGRAM FOR MALIGNANT NEOPLASM OF BREAST: ICD-10-CM

## 2023-02-13 DIAGNOSIS — Z00.00 HEALTHCARE MAINTENANCE: Primary | ICD-10-CM

## 2023-02-13 DIAGNOSIS — Z12.11 ENCOUNTER FOR SCREENING FOR MALIGNANT NEOPLASM OF COLON: ICD-10-CM

## 2023-02-13 DIAGNOSIS — I10 BENIGN ESSENTIAL HYPERTENSION: ICD-10-CM

## 2023-02-13 DIAGNOSIS — F33.41 RECURRENT MAJOR DEPRESSION IN PARTIAL REMISSION: ICD-10-CM

## 2023-02-13 PROCEDURE — 99396 PREV VISIT EST AGE 40-64: CPT | Performed by: FAMILY MEDICINE

## 2023-02-13 RX ORDER — IRBESARTAN 150 MG/1
150 TABLET ORAL DAILY
Qty: 90 TABLET | Refills: 1 | Status: SHIPPED | OUTPATIENT
Start: 2023-02-13

## 2023-02-13 NOTE — PROGRESS NOTES
Assessment and Plan     Patient Instructions    Problem List Items Addressed This Visit        Cardiac and Vasculature    Benign essential hypertension    Overview     Mejia 2/13/2023  BP is controlled well. She will recheck in six months. She will continue present meds.           Relevant Medications    irbesartan (AVAPRO) 150 MG tablet   Other Visit Diagnoses     Healthcare maintenance    -  Primary    Encounter for screening mammogram for malignant neoplasm of breast        Relevant Orders    Mammo Screening Digital Tomosynthesis Bilateral With CAD    Encounter for screening for malignant neoplasm of colon        Relevant Orders    Ambulatory Referral For Screening Colonoscopy             Return in about 6 months (around 8/13/2023).          Jaimee is a 58 y.o. being seen today for  Annual Exam   Subjective   History of the Present Illness   Annual Exam-Postmenopausal:     Jaimee Leonardo 58 y.o.female presents for annual exam.     Last pap: approximate date 2017 and was normal  Last mammogramapproximate date 2020 and will get it in May when she gets her Medicare and was normal  The patient is not taking hormone replacement therapy.   The patient wears seatbelts: yes.    The patient has regular exercise: no.     Exercise: 0 times/week.  This patient has ever been tested for HepC: yes .   Dental Exam. up to date  history; colonoscopy/sigmoidoscopy: colonoscopy 5 years ago with abnormalities, she is due for it now but is waiting for mammogram.  Her immunization are not up-to-date.  She is eating a healthy diet.   Labs results were ordered    Social History  She  reports that she quit smoking about 3 years ago. Her smoking use included cigarettes. She has a 15.00 pack-year smoking history. She has never used smokeless tobacco. She reports current alcohol use. She reports that she does not use drugs.  Objective   Vital Signs        BP Readings from Last 1 Encounters:   02/13/23 120/82     Wt Readings from Last  3 Encounters:   02/13/23 73 kg (161 lb)   08/05/22 72.9 kg (160 lb 12.8 oz)   08/06/21 73 kg (161 lb)   Body mass index is 25.22 kg/m².     Physical Exam  Vitals reviewed.   Constitutional:       General: She is not in acute distress.     Appearance: Normal appearance. She is well-developed.      Comments: Mask in place    HENT:      Head: Normocephalic and atraumatic.      Right Ear: Tympanic membrane, ear canal and external ear normal.      Left Ear: Tympanic membrane, ear canal and external ear normal.      Mouth/Throat:      Comments: Mask in place  Eyes:      Conjunctiva/sclera: Conjunctivae normal.   Neck:      Thyroid: No thyromegaly.      Trachea: No tracheal deviation.   Cardiovascular:      Rate and Rhythm: Normal rate and regular rhythm.      Heart sounds: Normal heart sounds.   Pulmonary:      Effort: Pulmonary effort is normal. No respiratory distress.      Breath sounds: Normal breath sounds. No wheezing or rales.   Chest:   Breasts:     Breasts are symmetrical.      Right: No mass.      Left: No mass.   Abdominal:      General: Bowel sounds are normal. There is no distension.      Palpations: Abdomen is soft.      Tenderness: There is no abdominal tenderness.   Musculoskeletal:         General: No deformity.      Cervical back: Normal range of motion and neck supple.      Right lower leg: No edema.      Left lower leg: No edema.   Lymphadenopathy:      Cervical: No cervical adenopathy.   Skin:     General: Skin is warm and dry.   Neurological:      Mental Status: She is alert and oriented to person, place, and time.   Psychiatric:         Behavior: Behavior normal.         Thought Content: Thought content normal.         Judgment: Judgment normal.               Patient was given instructions and counseling regarding her condition or for health maintenance advice. Please see specific information pulled into the AVS if appropriate.

## 2023-02-15 DIAGNOSIS — Z13.220 LIPID SCREENING: ICD-10-CM

## 2023-02-15 DIAGNOSIS — Z13.1 DIABETES MELLITUS SCREENING: ICD-10-CM

## 2023-02-15 DIAGNOSIS — I10 BENIGN ESSENTIAL HYPERTENSION: Primary | ICD-10-CM

## 2023-02-28 DIAGNOSIS — F51.01 PRIMARY INSOMNIA: ICD-10-CM

## 2023-02-28 DIAGNOSIS — F41.9 ANXIETY: ICD-10-CM

## 2023-02-28 RX ORDER — CLONAZEPAM 0.5 MG/1
TABLET ORAL
Qty: 45 TABLET | Refills: 0 | Status: SHIPPED | OUTPATIENT
Start: 2023-02-28 | End: 2023-03-27

## 2023-03-27 DIAGNOSIS — F41.9 ANXIETY: ICD-10-CM

## 2023-03-27 DIAGNOSIS — F51.01 PRIMARY INSOMNIA: ICD-10-CM

## 2023-03-27 RX ORDER — CLONAZEPAM 0.5 MG/1
TABLET ORAL
Qty: 45 TABLET | Refills: 0 | Status: SHIPPED | OUTPATIENT
Start: 2023-03-27

## 2023-04-12 DIAGNOSIS — F51.01 PRIMARY INSOMNIA: ICD-10-CM

## 2023-04-12 RX ORDER — TRAZODONE HYDROCHLORIDE 50 MG/1
TABLET ORAL
Qty: 90 TABLET | Refills: 2 | Status: SHIPPED | OUTPATIENT
Start: 2023-04-12

## 2023-04-21 ENCOUNTER — TELEPHONE (OUTPATIENT)
Dept: FAMILY MEDICINE CLINIC | Facility: CLINIC | Age: 58
End: 2023-04-21
Payer: COMMERCIAL

## 2023-05-23 ENCOUNTER — TELEPHONE (OUTPATIENT)
Dept: FAMILY MEDICINE CLINIC | Facility: CLINIC | Age: 58
End: 2023-05-23
Payer: MEDICARE

## 2023-05-23 DIAGNOSIS — Z13.89 ENCOUNTER FOR SCREENING FOR OTHER DISORDER: Primary | ICD-10-CM

## 2023-05-23 NOTE — TELEPHONE ENCOUNTER
PATIENT CALLED IN REGARDS TO LAB ORDERS FROM 2/13/23 SHE DID NOT GET THE LABS DONE ON 5/2/23. NO LAB ORDERS IN CHART    PLEASE CALL AND ADVISE [phone]

## 2023-05-27 DIAGNOSIS — F41.9 ANXIETY: ICD-10-CM

## 2023-05-27 DIAGNOSIS — F51.01 PRIMARY INSOMNIA: ICD-10-CM

## 2023-05-27 LAB
ALBUMIN SERPL-MCNC: 4.7 G/DL (ref 3.5–5.2)
ALBUMIN/GLOB SERPL: 2.4 G/DL
ALP SERPL-CCNC: 67 U/L (ref 39–117)
ALT SERPL-CCNC: 17 U/L (ref 1–33)
AST SERPL-CCNC: 22 U/L (ref 1–32)
BILIRUB SERPL-MCNC: 0.3 MG/DL (ref 0–1.2)
BUN SERPL-MCNC: 11 MG/DL (ref 6–20)
BUN/CREAT SERPL: 18.6 (ref 7–25)
CALCIUM SERPL-MCNC: 10.4 MG/DL (ref 8.6–10.5)
CHLORIDE SERPL-SCNC: 103 MMOL/L (ref 98–107)
CHOLEST SERPL-MCNC: 234 MG/DL (ref 0–200)
CO2 SERPL-SCNC: 28.9 MMOL/L (ref 22–29)
CREAT SERPL-MCNC: 0.59 MG/DL (ref 0.57–1)
EGFRCR SERPLBLD CKD-EPI 2021: 104 ML/MIN/1.73
ERYTHROCYTE [DISTWIDTH] IN BLOOD BY AUTOMATED COUNT: 12.6 % (ref 12.3–15.4)
GLOBULIN SER CALC-MCNC: 2 GM/DL
GLUCOSE SERPL-MCNC: 110 MG/DL (ref 65–99)
HCT VFR BLD AUTO: 38.4 % (ref 34–46.6)
HDLC SERPL-MCNC: 67 MG/DL (ref 40–60)
HGB BLD-MCNC: 12.6 G/DL (ref 12–15.9)
LDLC SERPL CALC-MCNC: 152 MG/DL (ref 0–100)
LDLC/HDLC SERPL: 2.24 {RATIO}
MCH RBC QN AUTO: 29.6 PG (ref 26.6–33)
MCHC RBC AUTO-ENTMCNC: 32.8 G/DL (ref 31.5–35.7)
MCV RBC AUTO: 90.1 FL (ref 79–97)
PLATELET # BLD AUTO: 262 10*3/MM3 (ref 140–450)
POTASSIUM SERPL-SCNC: 5.4 MMOL/L (ref 3.5–5.2)
PROT SERPL-MCNC: 6.7 G/DL (ref 6–8.5)
RBC # BLD AUTO: 4.26 10*6/MM3 (ref 3.77–5.28)
SODIUM SERPL-SCNC: 140 MMOL/L (ref 136–145)
TRIGL SERPL-MCNC: 85 MG/DL (ref 0–150)
VLDLC SERPL CALC-MCNC: 15 MG/DL (ref 5–40)
WBC # BLD AUTO: 4.19 10*3/MM3 (ref 3.4–10.8)

## 2023-05-30 RX ORDER — CLONAZEPAM 0.5 MG/1
TABLET ORAL
Qty: 45 TABLET | Refills: 0 | Status: SHIPPED | OUTPATIENT
Start: 2023-05-30

## 2023-06-13 ENCOUNTER — OFFICE VISIT (OUTPATIENT)
Dept: BEHAVIORAL HEALTH | Facility: CLINIC | Age: 58
End: 2023-06-13
Payer: MEDICARE

## 2023-06-13 VITALS
HEIGHT: 67 IN | WEIGHT: 166.1 LBS | SYSTOLIC BLOOD PRESSURE: 120 MMHG | BODY MASS INDEX: 26.07 KG/M2 | RESPIRATION RATE: 14 BRPM | OXYGEN SATURATION: 97 % | HEART RATE: 63 BPM | DIASTOLIC BLOOD PRESSURE: 82 MMHG

## 2023-06-13 DIAGNOSIS — G47.00 INSOMNIA, UNSPECIFIED TYPE: ICD-10-CM

## 2023-06-13 DIAGNOSIS — F41.1 GAD (GENERALIZED ANXIETY DISORDER): Primary | ICD-10-CM

## 2023-06-13 DIAGNOSIS — F33.1 MDD (MAJOR DEPRESSIVE DISORDER), RECURRENT EPISODE, MODERATE: ICD-10-CM

## 2023-06-13 RX ORDER — TRAZODONE HYDROCHLORIDE 50 MG/1
TABLET ORAL
Qty: 60 TABLET | Refills: 2 | Status: SHIPPED | OUTPATIENT
Start: 2023-06-13

## 2023-06-13 RX ORDER — DULOXETIN HYDROCHLORIDE 30 MG/1
CAPSULE, DELAYED RELEASE ORAL
Qty: 42 CAPSULE | Refills: 0 | Status: SHIPPED | OUTPATIENT
Start: 2023-06-13 | End: 2023-07-11

## 2023-06-13 RX ORDER — AMOXICILLIN 250 MG/1
250 CAPSULE ORAL 3 TIMES DAILY
COMMUNITY

## 2023-06-13 NOTE — PATIENT INSTRUCTIONS
Medication changes: Cross titration of Cymbalta & Effexor.  Okay to start Cymbalta 30 mg, take once a day for 2 weeks, at the same time reduce Effexor to 75 mg a day for 2 weeks and then stop, monitor for withdrawal-like symptoms or brain zaps, report to [facility] immediately.  Try taking 1.5-2 trazodone tablets since patient is only getting around 5 hours of sleep, may cause next morning hangover.  Patient educated on my stance of long-term daily use of benzodiazepines including abuse/addiction/diversion and withdrawal.  Therapy recommendation: Psychotherapy is not recommended at this time but remains a non-medication option if/when needed in the future.  Please call the office with any worsening of symptoms or onset of intolerable side effects. Patient is agreeable to call 911 or go to the nearest ER should he/she/they have any thoughts of harm to self or others.  Patient has been educated regarding multimodal approach with healthy nutrition, healthy sleep, regular physical activity, social activities, counseling, and medications.
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
## 2023-07-28 ENCOUNTER — TELEMEDICINE (OUTPATIENT)
Dept: BEHAVIORAL HEALTH | Facility: CLINIC | Age: 58
End: 2023-07-28
Payer: MEDICARE

## 2023-07-28 DIAGNOSIS — F33.1 MDD (MAJOR DEPRESSIVE DISORDER), RECURRENT EPISODE, MODERATE: Primary | ICD-10-CM

## 2023-07-28 DIAGNOSIS — F41.1 GAD (GENERALIZED ANXIETY DISORDER): ICD-10-CM

## 2023-07-28 NOTE — PATIENT INSTRUCTIONS
Medication changes: None    Please call the office with any worsening of symptoms or onset of intolerable side effects. Patient is agreeable to call 911 or go to the nearest ER should he/she/they have any thoughts of harm to self or others.  Patient has been educated regarding multimodal approach with healthy nutrition, healthy sleep, regular physical activity, social activities, counseling, and medications.

## 2023-07-28 NOTE — PROGRESS NOTES
Patient assessed today via MyChart through EPIC pt is at home in a secure environment and verbalizes privacy during interview. LINDA Ovalle is at home in a secure environment using a secure laptop.The patient's condition being diagnosed/treated is appropriate for telemedicine.The provider identified himself as well as his credentials.The patient, and/or patient's guardian, consent to be seen remotely, and when consent is given they understand that the consent allows for patient identifiable information to be sent to a third party as needed. They may refuse to be seen remotely at any time. The electronic data is encrypted and password protected, and the patient and/or guardian has been advised of the potential risks to privacy not withstanding such measures.    [facility] PROGRESS NOTE  ROGELIO VITALY [facility]  [address] [place] [address]    NAME: Jaimee Leonardo     : 1965   MRN: [id number]     Patient Care Team:  Saumya Hurd MD as PCP - General (Family Medicine)  Rogelio Boo APRN as Nurse Practitioner (Psychiatry)    DATE: 2023    Subjective     Chief Complaint   Patient presents with    Depression    Anxiety      HPI:  58 y.o. female established patient of [facility] Boo [facility] seen today for F/U. Since last appt pt reports improvement in condition being treated, diagnosis listed below. Pt endorses daily compliance with psychiatric medications. Since last appt pt denies new medications, pt denies new medical problems. Current S/S reviewed with pt, PHQ/JANELLE scores reviewed with pt and are stable. Sleep disturbance is denied, sleep is described as generally restful overall. No side effects to meds are currently reported or in evidence during assessment.  The patient would like to not adjust or change their medications at this time.                Specialty Problems          Psychiatry Problems    MDD (major depressive disorder), recurrent episode, moderate          Social  History     Social History Narrative    Not on file      Social History     Occupational History    Not on file   Tobacco Use    Smoking status: Former     Packs/day: 0.50     Years: 30.00     Pack years: 15.00     Types: Cigarettes     Quit date: 9/15/2019     Years since quitting: 3.8     Passive exposure: Past    Smokeless tobacco: Never   Vaping Use    Vaping Use: Never used   Substance and Sexual Activity    Alcohol use: Yes     Comment: social    Drug use: No    Sexual activity: Yes     Partners: Male     Birth control/protection: None     Family History   Problem Relation Age of Onset    Stroke Mother     Bone cancer Father     Alzheimer's disease Father       Past Medical History:   Diagnosis Date    Allergic     Anxiety     Arthritis     Colon polyp     Eye inflammation     GERD (gastroesophageal reflux disease)     Headache     Heart murmur     Hypertension     Irritable bowel syndrome     Low back pain      REVIEW OF SYSTEMS:  Constitutional: Negative for recent acute illness.    Respiratory: Negative for chest tightness and shortness of breath.    Cardiovascular: Negative for chest pain.   Neurological: Negative for dizziness and light-headedness.   Musculoskeletal: Negative for recent falls/injuries    Objective   PHYSICAL EXAM:  Constitutional:       Normal appearance, appears in no acute distress  Head:      Head: Normocephalic.   Pulmonary:      Effort: Pulmonary effort is normal, no cough observed   Neurological:      Mental Status: He/she/they are A/Ax3  Skin:         Skin is dry    There were no vitals taken for this visit.  No LMP recorded. Patient is postmenopausal.    PHQ-9 Depression Screening  Little interest or pleasure in doing things? (P) 1-->several days   Feeling down, depressed, or hopeless? (P) 1-->several days   Trouble falling or staying asleep, or sleeping too much? (P) 1-->several days   Feeling tired or having little energy?     Poor appetite or overeating? (P) 1-->several days    Feeling bad about yourself/you are a failure/have let yourself or your family down? (P) 1-->several days   Trouble concentrating on things? (P) 1-->several days   Psychomotor agitation/retardation (P) 1-->several days   Thoughts about death/dying/suicide (P) 0-->not at all   PHQ-9 Total Score (P) 9   How difficult have these problems for you? (P) somewhat difficult     GAD7 Documentation:  Feeling nervous, anxious or on edge (P) 1   Not being able to stop or control worrying (P) 1   Worrying too much about different things (P) 1   Trouble relaxing (P) 1   Being so restless that it is hard to sit still (P) 0   Becoming easily annoyed or irritable (P) 1   Feeling Afraid as if something awful might happen (P) 0   JANELLE Total Score (P) 5   How difficult have these problems made it for you? (P) Somewhat difficult     MENTAL STATUS EXAM   General Appearance:  Cleanly groomed and dressed and well developed  Eye Contact:  Fair  Attitude:  Cooperative  Motor Activity:  Normal posture  Speech:  Normal rate, tone, volume  Mood and affect:  Normal, pleasant  Thought Process:  Logical  Associations/ Thought Content:  No delusions  Hallucinations:  None  Suicidal Ideations:  Not present  Homicidal Ideation:  Not present  Sensorium:  Alert  Orientation:  Person, place, time and situation  Attention Span/ Concentration:  Good  Fund of Knowledge:  Appropriate for age and educational level  Intellectual Functioning:  Average range  Insight:  Fair  Judgement:  Fair  Reliability:  Fair  Impulse Control:  Fair    LABS:  Telephone on 05/23/2023   Component Date Value    WBC 05/26/2023 4.19     RBC 05/26/2023 4.26     Hemoglobin 05/26/2023 12.6     Hematocrit 05/26/2023 38.4     MCV 05/26/2023 90.1     MCH 05/26/2023 29.6     MCHC 05/26/2023 32.8     RDW 05/26/2023 12.6     Platelets 05/26/2023 262     Glucose 05/26/2023 110 (H)     BUN 05/26/2023 11     Creatinine 05/26/2023 0.59     EGFR Result 05/26/2023 104.0     BUN/Creatinine Ratio  05/26/2023 18.6     Sodium 05/26/2023 140     Potassium 05/26/2023 5.4 (H)     Chloride 05/26/2023 103     Total CO2 05/26/2023 28.9     Calcium 05/26/2023 10.4     Total Protein 05/26/2023 6.7     Albumin 05/26/2023 4.7     Globulin 05/26/2023 2.0     A/G Ratio 05/26/2023 2.4     Total Bilirubin 05/26/2023 0.3     Alkaline Phosphatase 05/26/2023 67     AST (SGOT) 05/26/2023 22     ALT (SGPT) 05/26/2023 17     Total Cholesterol 05/26/2023 234 (H)     Triglycerides 05/26/2023 85     HDL Cholesterol 05/26/2023 67 (H)     VLDL Cholesterol Joe 05/26/2023 15     LDL Chol Calc (NIH) 05/26/2023 152 (H)     LDL/HDL RATIO 05/26/2023 2.24       ALLERGY:  Allergies   Allergen Reactions    Bee Venom Anaphylaxis    Morphine And Related     Rofecoxib         Current Outpatient Medications on File Prior to Visit   Medication Sig    amoxicillin (AMOXIL) 250 MG capsule Take 1 capsule by mouth 3 (Three) Times a Day.    baclofen (LIORESAL) 10 MG tablet     clonazePAM (KlonoPIN) 0.5 MG tablet TAKE 1/2 TABLET BY MOUTH EVERY MORNING AND TAKE ONE TABLET BY MOUTH EVERY EVENING    diphenhydrAMINE (BENADRYL) 25 MG tablet Take by mouth. (Patient not taking: Reported on 6/13/2023)    DULoxetine (CYMBALTA) 60 MG capsule Take 1 capsule by mouth Daily.    gabapentin (NEURONTIN) 600 MG tablet 1 tablet 3 (Three) Times a Day.    irbesartan (AVAPRO) 150 MG tablet Take 1 tablet by mouth Daily.    MULTIPLE VITAMINS ESSENTIAL PO Take by mouth.    omeprazole (priLOSEC) 40 MG capsule TAKE 1 CAPSULE BY MOUTH EVERY DAY    oxyCODONE (ROXICODONE) 5 MG immediate release tablet 1 tablet 3 (Three) Times a Day.    rizatriptan (MAXALT) 10 MG tablet TAKE ONE TABLET BY MOUTH AT ONSET OF HEADACHE; MAY REPEAT ONE TABLET IN 2 HOURS IF NEEDED.    traZODone (DESYREL) 50 MG tablet Take 1 or 2 tablets by mouth at bedtime as needed for sleep     No current facility-administered medications on file prior to visit.      Assessment     ASSESSMENT/PLAN/INSTRUCTIONS/EDUCATION    PSYCHOTHERAPY:  In/Start Time: 1100.  Out/Stop Time: 1116.  16 minutes of face to face direct patient care with patient was spent for supportive psychotherapy including strengthening self awareness and insights, strengthening coping skills, counseling the patient regarding diagnoses, and utilizing cognitive behavioral therapy to assist the patient in recognizing more appropriate coping mechanisms which are proven effective in reducing the severity of frequency of symptoms.  This APRN assisted the patient in processing the patient's diagnoses, and also acknowledged and normalized the patient's thoughts, feelings, and concerns This APRN allowed the patient to freely discuss issues without interruption or judgment.      (F33.1) MDD (major depressive disorder), recurrent episode, moderate    (F41.1) JANELLE (generalized anxiety disorder)     - The above listed condition is improving with treatment, continue current treatment plan, med changes per orders. Pt instructed to monitor for improvement/worsening S/S and report to [facility] immediately. Condition will be re-assessed at next F/U appt.    PT INSTRUCTIONS FROM AVS:  Medication changes: None    Please call the office with any worsening of symptoms or onset of intolerable side effects. Patient is agreeable to call 911 or go to the nearest ER should he/she/they have any thoughts of harm to self or others.  Patient has been educated regarding multimodal approach with healthy nutrition, healthy sleep, regular physical activity, social activities, counseling, and medications.     Return in 2 months (on 9/28/2023) for Medication Check.    Future Appointments         Provider Department Center    9/25/2023 11:00 AM Piotr Boo APRN [facility] [place]           There are no discontinued medications.      No orders of the defined types were placed in this encounter.    Barriers: medically complex, high  risk medication and stress  Strengths:  motivated , positive attitude and knowledgeable about condition/medications/disease course     -Progress toward goal: Not at goal  -Functional Status: Moderate impairment   -Prognosis: Fair with Ongoing Treatment        SUMMARY/DISCUSSION:  Pt past social/medical/family history reviewed/updated. ROS conducted, medications/allergies, reviewed, patient was screened today for depression/anxiety, PHQ/JANELLE scores reviewed.  Most recent vitals/labs reviewed. Pt was given appropriate time to ask questions and concerns were addressed. A thorough discussion was had that included review of disease process, need for continued monitoring and additional treatment options including use of pharmacological and non-pharmacological approaches to care, decisions were made and agreed upon by patient and provider. Discussed the risks, benefits, and potential side effects of the medications; patient ackowledged and verbally consented.     Part of this note may be an electronic transcription/translation of spoken language to printed text using the Dragon Dictation System. Some of the data in this electronic note has been brought forward from a previous encounter, any necessary changes have been made, it has been reviewed by this APRN, and it is accurate.    This document has been electronically signed by NAOMI Bocanegra July 28, 2023 16:17 EDT

## 2023-08-05 DIAGNOSIS — F41.9 ANXIETY: ICD-10-CM

## 2023-08-05 DIAGNOSIS — F51.01 PRIMARY INSOMNIA: ICD-10-CM

## 2023-08-07 DIAGNOSIS — I10 BENIGN ESSENTIAL HYPERTENSION: ICD-10-CM

## 2023-08-07 RX ORDER — CLONAZEPAM 0.5 MG/1
TABLET ORAL
Qty: 45 TABLET | Refills: 0 | Status: SHIPPED | OUTPATIENT
Start: 2023-08-07

## 2023-08-07 RX ORDER — IRBESARTAN 150 MG/1
TABLET ORAL
Qty: 30 TABLET | Refills: 0 | Status: SHIPPED | OUTPATIENT
Start: 2023-08-07

## 2023-08-18 ENCOUNTER — TELEMEDICINE (OUTPATIENT)
Dept: FAMILY MEDICINE CLINIC | Facility: TELEHEALTH | Age: 58
End: 2023-08-18
Payer: MEDICARE

## 2023-08-18 DIAGNOSIS — U07.1 COVID: Primary | ICD-10-CM

## 2023-08-19 NOTE — PROGRESS NOTES
You have chosen to receive care through a telehealth visit.  Do you consent to use a video/audio connection for your medical care today? Yes     CHIEF COMPLAINT  Chief Complaint   Patient presents with    COVID positive         HPI  Jaimee Leonardo is a 58 y.o. female  presents with complaint of positive COVID test.  She tested positive today.  She is complaining of headache and sore throat.  Her son also has COVID    Review of Systems   HENT:  Positive for sore throat.    Neurological:  Positive for headaches.   All other systems reviewed and are negative.    Past Medical History:   Diagnosis Date    Allergic     Anxiety     Arthritis     Colon polyp     Eye inflammation     GERD (gastroesophageal reflux disease)     Headache     Heart murmur     Hypertension     Irritable bowel syndrome     Low back pain        Family History   Problem Relation Age of Onset    Stroke Mother     Bone cancer Father     Alzheimer's disease Father        Social History     Socioeconomic History    Marital status:    Tobacco Use    Smoking status: Former     Packs/day: 0.50     Years: 30.00     Pack years: 15.00     Types: Cigarettes     Quit date: 9/15/2019     Years since quitting: 3.9     Passive exposure: Past    Smokeless tobacco: Never   Vaping Use    Vaping Use: Never used   Substance and Sexual Activity    Alcohol use: Yes     Comment: social    Drug use: No    Sexual activity: Yes     Partners: Male     Birth control/protection: None       Jaimee Leonardo  reports that she quit smoking about 3 years ago. Her smoking use included cigarettes. She has a 15.00 pack-year smoking history. She has been exposed to tobacco smoke. She has never used smokeless tobacco..       There were no vitals taken for this visit.    PHYSICAL EXAM  Physical Exam   Constitutional: She appears well-developed and well-nourished.   HENT:   Head: Normocephalic.   Eyes: Pupils are equal, round, and reactive to light.   Pulmonary/Chest: Effort  normal.   Musculoskeletal: Normal range of motion.   Neurological: She is alert.   Psychiatric: She has a normal mood and affect.     Results for orders placed or performed in visit on 05/23/23   CBC (No Diff)    Specimen: Blood   Result Value Ref Range    WBC 4.19 3.40 - 10.80 10*3/mm3    RBC 4.26 3.77 - 5.28 10*6/mm3    Hemoglobin 12.6 12.0 - 15.9 g/dL    Hematocrit 38.4 34.0 - 46.6 %    MCV 90.1 79.0 - 97.0 fL    MCH 29.6 26.6 - 33.0 pg    MCHC 32.8 31.5 - 35.7 g/dL    RDW 12.6 12.3 - 15.4 %    Platelets 262 140 - 450 10*3/mm3   Comprehensive Metabolic Panel    Specimen: Blood   Result Value Ref Range    Glucose 110 (H) 65 - 99 mg/dL    BUN 11 6 - 20 mg/dL    Creatinine 0.59 0.57 - 1.00 mg/dL    EGFR Result 104.0 >60.0 mL/min/1.73    BUN/Creatinine Ratio 18.6 7.0 - 25.0    Sodium 140 136 - 145 mmol/L    Potassium 5.4 (H) 3.5 - 5.2 mmol/L    Chloride 103 98 - 107 mmol/L    Total CO2 28.9 22.0 - 29.0 mmol/L    Calcium 10.4 8.6 - 10.5 mg/dL    Total Protein 6.7 6.0 - 8.5 g/dL    Albumin 4.7 3.5 - 5.2 g/dL    Globulin 2.0 gm/dL    A/G Ratio 2.4 g/dL    Total Bilirubin 0.3 0.0 - 1.2 mg/dL    Alkaline Phosphatase 67 39 - 117 U/L    AST (SGOT) 22 1 - 32 U/L    ALT (SGPT) 17 1 - 33 U/L   Lipid Panel With LDL / HDL Ratio    Specimen: Blood   Result Value Ref Range    Total Cholesterol 234 (H) 0 - 200 mg/dL    Triglycerides 85 0 - 150 mg/dL    HDL Cholesterol 67 (H) 40 - 60 mg/dL    VLDL Cholesterol Joe 15 5 - 40 mg/dL    LDL Chol Calc (NIH) 152 (H) 0 - 100 mg/dL    LDL/HDL RATIO 2.24        Diagnoses and all orders for this visit:    1. COVID (Primary)  -     Molnupiravir (LAGEVRIO) 200 MG capsule; Take 4 capsules by mouth Every 12 (Twelve) Hours for 5 days.  Dispense: 40 capsule; Refill: 0          FOLLOW-UP  As discussed during visit with PCP/[facility] Care if no improvement or Urgent Care/Emergency Department if worsening of symptoms    Instructed pt to use Thera-flu for her symptoms and Chloraseptic throat lozenges and  warm salt water gargles for throat pain.    Patient verbalizes understanding of medication dosage, comfort measures, instructions for treatment and follow-up.    NAOMI Gilmore  08/18/2023  20:04 EDT    The use of a video visit has been reviewed with the patient and verbal informed consent has been obtained. Myself and Jaimee Leonardo participated in this visit. The patient is located in [address] [place] [address] [address] [place] [address].    I am located in [place], [place]. Mychart and Twilio were utilized. I spent 20 minutes in the patient's chart for this visit.

## 2023-08-25 ENCOUNTER — LAB (OUTPATIENT)
Dept: LAB | Facility: HOSPITAL | Age: 58
End: 2023-08-25
Payer: MEDICARE

## 2023-08-25 ENCOUNTER — TRANSCRIBE ORDERS (OUTPATIENT)
Dept: LAB | Facility: HOSPITAL | Age: 58
End: 2023-08-25
Payer: MEDICARE

## 2023-08-25 DIAGNOSIS — G60.8 HEREDITARY SENSORY NEUROPATHY: ICD-10-CM

## 2023-08-25 DIAGNOSIS — M79.18 DIFFUSE MYOFASCIAL PAIN SYNDROME: Primary | ICD-10-CM

## 2023-08-25 DIAGNOSIS — G71.038 LIMB-GIRDLE MUSCULAR DYSTROPHY R21 ASSOCIATED WITH MUTATION IN POGLUT1 GENE: ICD-10-CM

## 2023-08-25 DIAGNOSIS — M25.549 ARTHRALGIA OF HAND, UNSPECIFIED LATERALITY: ICD-10-CM

## 2023-08-25 DIAGNOSIS — R53.83 TIREDNESS: ICD-10-CM

## 2023-08-25 DIAGNOSIS — M79.18 DIFFUSE MYOFASCIAL PAIN SYNDROME: ICD-10-CM

## 2023-08-25 LAB
BASOPHILS # BLD AUTO: 0.05 10*3/MM3 (ref 0–0.2)
BASOPHILS NFR BLD AUTO: 1.6 % (ref 0–1.5)
CHROMATIN AB SERPL-ACNC: <10 IU/ML (ref 0–14)
CRP SERPL-MCNC: <0.3 MG/DL (ref 0–0.5)
DEPRECATED RDW RBC AUTO: 40.3 FL (ref 37–54)
EOSINOPHIL # BLD AUTO: 0 10*3/MM3 (ref 0–0.4)
EOSINOPHIL NFR BLD AUTO: 0 % (ref 0.3–6.2)
ERYTHROCYTE [DISTWIDTH] IN BLOOD BY AUTOMATED COUNT: 12.5 % (ref 12.3–15.4)
ERYTHROCYTE [SEDIMENTATION RATE] IN BLOOD: 9 MM/HR (ref 0–30)
HCT VFR BLD AUTO: 37.4 % (ref 34–46.6)
HGB BLD-MCNC: 12.3 G/DL (ref 12–15.9)
IMM GRANULOCYTES # BLD AUTO: 0 10*3/MM3 (ref 0–0.05)
IMM GRANULOCYTES NFR BLD AUTO: 0 % (ref 0–0.5)
LYMPHOCYTES # BLD AUTO: 1.34 10*3/MM3 (ref 0.7–3.1)
LYMPHOCYTES NFR BLD AUTO: 42.8 % (ref 19.6–45.3)
MCH RBC QN AUTO: 29.2 PG (ref 26.6–33)
MCHC RBC AUTO-ENTMCNC: 32.9 G/DL (ref 31.5–35.7)
MCV RBC AUTO: 88.8 FL (ref 79–97)
MONOCYTES # BLD AUTO: 0.21 10*3/MM3 (ref 0.1–0.9)
MONOCYTES NFR BLD AUTO: 6.7 % (ref 5–12)
NEUTROPHILS NFR BLD AUTO: 1.53 10*3/MM3 (ref 1.7–7)
NEUTROPHILS NFR BLD AUTO: 48.9 % (ref 42.7–76)
NRBC BLD AUTO-RTO: 0 /100 WBC (ref 0–0.2)
PLATELET # BLD AUTO: 235 10*3/MM3 (ref 140–450)
PMV BLD AUTO: 9.8 FL (ref 6–12)
RBC # BLD AUTO: 4.21 10*6/MM3 (ref 3.77–5.28)
WBC NRBC COR # BLD: 3.13 10*3/MM3 (ref 3.4–10.8)

## 2023-08-25 PROCEDURE — 85670 THROMBIN TIME PLASMA: CPT

## 2023-08-25 PROCEDURE — 86235 NUCLEAR ANTIGEN ANTIBODY: CPT

## 2023-08-25 PROCEDURE — 85597 PHOSPHOLIPID PLTLT NEUTRALIZ: CPT

## 2023-08-25 PROCEDURE — 86147 CARDIOLIPIN ANTIBODY EA IG: CPT

## 2023-08-25 PROCEDURE — 86140 C-REACTIVE PROTEIN: CPT

## 2023-08-25 PROCEDURE — 85025 COMPLETE CBC W/AUTO DIFF WBC: CPT

## 2023-08-25 PROCEDURE — 36415 COLL VENOUS BLD VENIPUNCTURE: CPT

## 2023-08-25 PROCEDURE — 85613 RUSSELL VIPER VENOM DILUTED: CPT

## 2023-08-25 PROCEDURE — 85730 THROMBOPLASTIN TIME PARTIAL: CPT

## 2023-08-25 PROCEDURE — 86431 RHEUMATOID FACTOR QUANT: CPT

## 2023-08-25 PROCEDURE — 85732 THROMBOPLASTIN TIME PARTIAL: CPT

## 2023-08-25 PROCEDURE — 85610 PROTHROMBIN TIME: CPT

## 2023-08-25 PROCEDURE — 85598 HEXAGNAL PHOSPH PLTLT NEUTRL: CPT

## 2023-08-25 PROCEDURE — 85652 RBC SED RATE AUTOMATED: CPT

## 2023-08-25 PROCEDURE — 86146 BETA-2 GLYCOPROTEIN ANTIBODY: CPT

## 2023-08-25 PROCEDURE — 86225 DNA ANTIBODY NATIVE: CPT

## 2023-08-28 LAB
CENTROMERE B AB SER-ACNC: <0.2 AI (ref 0–0.9)
CHROMATIN AB SERPL-ACNC: <0.2 AI (ref 0–0.9)
DSDNA AB SER-ACNC: 1 IU/ML (ref 0–9)
ENA JO1 AB SER-ACNC: <0.2 AI (ref 0–0.9)
ENA RNP AB SER-ACNC: 0.3 AI (ref 0–0.9)
ENA SCL70 AB SER-ACNC: <0.2 AI (ref 0–0.9)
ENA SM AB SER-ACNC: <0.2 AI (ref 0–0.9)
ENA SS-A AB SER-ACNC: <0.2 AI (ref 0–0.9)
ENA SS-B AB SER-ACNC: <0.2 AI (ref 0–0.9)
Lab: NORMAL

## 2023-09-03 DIAGNOSIS — I10 BENIGN ESSENTIAL HYPERTENSION: ICD-10-CM

## 2023-09-04 DIAGNOSIS — F41.9 ANXIETY: ICD-10-CM

## 2023-09-04 DIAGNOSIS — G47.00 INSOMNIA, UNSPECIFIED TYPE: ICD-10-CM

## 2023-09-04 DIAGNOSIS — G43.109 MIGRAINE WITH AURA AND WITHOUT STATUS MIGRAINOSUS, NOT INTRACTABLE: ICD-10-CM

## 2023-09-04 DIAGNOSIS — F51.01 PRIMARY INSOMNIA: ICD-10-CM

## 2023-09-05 LAB
APTT HEX PL PPP: 5 SEC
APTT IMM NP PPP: NORMAL SEC
APTT PPP 1:1 SALINE: NORMAL SEC
APTT PPP: 25.5 SEC
B2 GLYCOPROT1 IGA SER-ACNC: <10 SAU
B2 GLYCOPROT1 IGG SER-ACNC: <10 SGU
B2 GLYCOPROT1 IGM SER-ACNC: <10 SMU
CARDIOLIPIN IGG SER IA-ACNC: <10 GPL
CARDIOLIPIN IGM SER IA-ACNC: <10 MPL
CONFIRM APTT: 0.1 SEC
CONFIRM DRVVT: NORMAL SEC
DRVVT SCREEN TO CONFIRM RATIO: NORMAL RATIO
INR PPP: 1 RATIO
LABORATORY COMMENT REPORT: NORMAL
PROTHROMBIN TIME: 10.2 SEC
SCREEN DRVVT: 34.8 SEC
THROMBIN TIME: 16.6 SEC

## 2023-09-05 RX ORDER — CLONAZEPAM 0.5 MG/1
TABLET ORAL
Qty: 45 TABLET | Refills: 0 | Status: SHIPPED | OUTPATIENT
Start: 2023-09-05

## 2023-09-05 RX ORDER — TRAZODONE HYDROCHLORIDE 50 MG/1
TABLET ORAL
Qty: 60 TABLET | Refills: 2 | Status: SHIPPED | OUTPATIENT
Start: 2023-09-05

## 2023-09-05 RX ORDER — RIZATRIPTAN BENZOATE 10 MG/1
TABLET ORAL
Qty: 12 TABLET | Refills: 1 | Status: SHIPPED | OUTPATIENT
Start: 2023-09-05

## 2023-09-05 RX ORDER — IRBESARTAN 150 MG/1
TABLET ORAL
Qty: 15 TABLET | Refills: 0 | Status: SHIPPED | OUTPATIENT
Start: 2023-09-05

## 2023-09-06 ENCOUNTER — OFFICE VISIT (OUTPATIENT)
Dept: FAMILY MEDICINE CLINIC | Facility: CLINIC | Age: 58
End: 2023-09-06
Payer: MEDICARE

## 2023-09-06 VITALS
SYSTOLIC BLOOD PRESSURE: 130 MMHG | HEART RATE: 48 BPM | BODY MASS INDEX: 25.57 KG/M2 | OXYGEN SATURATION: 95 % | HEIGHT: 67 IN | RESPIRATION RATE: 18 BRPM | DIASTOLIC BLOOD PRESSURE: 82 MMHG | WEIGHT: 162.9 LBS

## 2023-09-06 DIAGNOSIS — K22.70 BARRETT'S ESOPHAGUS WITHOUT DYSPLASIA: ICD-10-CM

## 2023-09-06 DIAGNOSIS — Z12.11 ENCOUNTER FOR SCREENING FOR MALIGNANT NEOPLASM OF COLON: ICD-10-CM

## 2023-09-06 DIAGNOSIS — Z00.00 WELCOME TO MEDICARE PREVENTIVE VISIT: Primary | ICD-10-CM

## 2023-09-06 DIAGNOSIS — R00.1 BRADYCARDIA: ICD-10-CM

## 2023-09-06 DIAGNOSIS — Z12.31 ENCOUNTER FOR SCREENING MAMMOGRAM FOR MALIGNANT NEOPLASM OF BREAST: ICD-10-CM

## 2023-09-06 DIAGNOSIS — R00.2 PALPITATIONS: ICD-10-CM

## 2023-09-06 DIAGNOSIS — U07.1 COVID-19 VIRUS INFECTION: ICD-10-CM

## 2023-09-06 DIAGNOSIS — D70.9 NEUTROPENIA, UNSPECIFIED TYPE: ICD-10-CM

## 2023-09-06 RX ORDER — EPINEPHRINE 0.3 MG/.3ML
0.3 INJECTION SUBCUTANEOUS ONCE
Qty: 1 EACH | Refills: 0 | Status: SHIPPED | OUTPATIENT
Start: 2023-09-06 | End: 2023-09-06

## 2023-09-06 NOTE — PROGRESS NOTES
"Subsequent Medicare Wellness Visit  Subjective    Jaimee Leonardo is a 58 y.o. female who presents for a Subsequent Medicare Wellness Visit.    Compared to one year ago, the patient feels her physical health is worse and her mental health is worse.  Recent Hospitalizations:  She was not admitted to the hospital during the last year.   Current Medical Providers:  Patient Care Team:  Saumya Hurd MD as PCP - General (Family Medicine)  Piotr Boo APRN as Nurse Practitioner (Psychiatry)  Opioid medication/s are on active medication list.    She goes to pain medicine.   Aspirin is not on active medication list.  Aspirin use is not indicated based on review of current medical condition/s. Risk of harm outweighs potential benefits.  .  Advance Care Planning   Advance Directive is not on file.  ACP discussion was held with the patient during this visit. Patient does not have an advance directive, information provided.  Objective    Vitals:    09/06/23 1019 09/06/23 1215   BP: 130/82    Pulse: 64 (!) 48   Resp: 18    SpO2: 95%    Weight: 73.9 kg (162 lb 14.4 oz)    Height: 170.2 cm (67\")      Wt Readings from Last 3 Encounters:   09/06/23 73.9 kg (162 lb 14.4 oz)   06/13/23 75.3 kg (166 lb 1.6 oz)   02/13/23 73 kg (161 lb)     Estimated body mass index is 25.51 kg/m² as calculated from the following:    Height as of this encounter: 170.2 cm (67\").    Weight as of this encounter: 73.9 kg (162 lb 14.4 oz).  BMI is >= 25 and <30. (Overweight) The following options were offered after discussion;: none (medical contraindication)     Does the patient have evidence of cognitive impairment? No  LABS:   Recent Results (from the past 672 hour(s))   Lupus Anticoagulant Panel    Collection Time: 08/25/23  1:15 PM    Specimen: Blood   Result Value Ref Range    Protime 10.2 sec    INR 1.0 ratio    aPTT 25.5 sec    APTT 1:1 NP TNP sec    APTT 1:1 Saline TNP sec    Thrombin Time 16.6 sec    DRVVT Screen Seconds 34.8 sec    " DRVVT Confirm Seconds TNP sec    DRVVT/Confirm Ratio TNP ratio    Hex Phosph Neut Test 5 sec    Platelet Neutralization 0.1 sec    Anticardiolipin IgG <10 GPL    Anticardiolipin IgM <10 MPL    Beta-2 Glyco 1 IgG <10 SGU    Beta-2 Glyco 1 IgM <10 SMU    Beta-2 Glyco 1 IgA <10 NEW    LAC Interpretation Comment    Sedimentation Rate    Collection Time: 08/25/23  1:15 PM    Specimen: Blood   Result Value Ref Range    Sed Rate 9 0 - 30 mm/hr   Rheumatoid Factor    Collection Time: 08/25/23  1:15 PM    Specimen: Blood   Result Value Ref Range    Rheumatoid Factor Quantitative <10.0 0.0 - 14.0 IU/mL   C-reactive Protein    Collection Time: 08/25/23  1:15 PM    Specimen: Blood   Result Value Ref Range    C-Reactive Protein <0.30 0.00 - 0.50 mg/dL   TORIBIO Comprehensive Panel    Collection Time: 08/25/23  1:15 PM    Specimen: Blood   Result Value Ref Range    Anti-DNA (DS) Ab Qn 1 0 - 9 IU/mL    RNP Antibodies 0.3 0.0 - 0.9 AI    Joseph Antibodies <0.2 0.0 - 0.9 AI    Antiscleroderma-70 Antibodies <0.2 0.0 - 0.9 AI    Sjogren's Anti-SS-A <0.2 0.0 - 0.9 AI    Sjogren's Anti-SS-B <0.2 0.0 - 0.9 AI    Antichromatin Antibodies <0.2 0.0 - 0.9 AI    TONO-1 IgG <0.2 0.0 - 0.9 AI    Anti-Centromere B Antibodies <0.2 0.0 - 0.9 AI    See below: Comment    CBC Auto Differential    Collection Time: 08/25/23  1:15 PM    Specimen: Blood   Result Value Ref Range    WBC 3.13 (L) 3.40 - 10.80 10*3/mm3    RBC 4.21 3.77 - 5.28 10*6/mm3    Hemoglobin 12.3 12.0 - 15.9 g/dL    Hematocrit 37.4 34.0 - 46.6 %    MCV 88.8 79.0 - 97.0 fL    MCH 29.2 26.6 - 33.0 pg    MCHC 32.9 31.5 - 35.7 g/dL    RDW 12.5 12.3 - 15.4 %    RDW-SD 40.3 37.0 - 54.0 fl    MPV 9.8 6.0 - 12.0 fL    Platelets 235 140 - 450 10*3/mm3    Neutrophil % 48.9 42.7 - 76.0 %    Lymphocyte % 42.8 19.6 - 45.3 %    Monocyte % 6.7 5.0 - 12.0 %    Eosinophil % 0.0 (L) 0.3 - 6.2 %    Basophil % 1.6 (H) 0.0 - 1.5 %    Immature Grans % 0.0 0.0 - 0.5 %    Neutrophils, Absolute 1.53 (L) 1.70 - 7.00  10*3/mm3    Lymphocytes, Absolute 1.34 0.70 - 3.10 10*3/mm3    Monocytes, Absolute 0.21 0.10 - 0.90 10*3/mm3    Eosinophils, Absolute 0.00 0.00 - 0.40 10*3/mm3    Basophils, Absolute 0.05 0.00 - 0.20 10*3/mm3    Immature Grans, Absolute 0.00 0.00 - 0.05 10*3/mm3    nRBC 0.0 0.0 - 0.2 /100 WBC     HEALTH RISK ASSESSMENT  Smoking Status:  Social History     Tobacco Use   Smoking Status Former    Packs/day: 0.50    Years: 30.00    Pack years: 15.00    Types: Cigarettes    Quit date: 9/15/2019    Years since quitting: 3.9    Passive exposure: Past   Smokeless Tobacco Never     Alcohol Consumption:  Social History     Substance and Sexual Activity   Alcohol Use Yes    Comment: Maybe once a month     Fall Risk Screen:  KIM Fall Risk Assessment was completed, and patient is at MODERATE risk for falls. Assessment completed on:2023  Depression Screenin/6/2023    10:23 AM   PHQ-2/PHQ-9 Depression Screening   Little Interest or Pleasure in Doing Things 0-->not at all   Feeling Down, Depressed or Hopeless 0-->not at all   PHQ-9: Brief Depression Severity Measure Score 0     Health Habits and Functional and Cognitive Screenin/6/2023    10:23 AM   Functional & Cognitive Status   Do you have difficulty preparing food and eating? No   Do you have difficulty bathing yourself, getting dressed or grooming yourself? No   Do you have difficulty using the toilet? No   Do you have difficulty moving around from place to place? No   Do you have trouble with steps or getting out of a bed or a chair? No   Current Diet Well Balanced Diet   Dental Exam Not up to date   Eye Exam Not up to date   Exercise (times per week) 3 times per week   Current Exercises Include Swimming   Do you need help using the phone?  No   Are you deaf or do you have serious difficulty hearing?  No   Do you need help to go to places out of walking distance? No   Do you need help shopping? No   Do you need help preparing meals?  No   Do you need  help with housework?  No   Do you need help with laundry? No   Do you need help taking your medications? No   Do you need help managing money? No   Do you ever drive or ride in a car without wearing a seat belt? No   Have you felt unusual stress, anger or loneliness in the last month? No   Who do you live with? Spouse   If you need help, do you have trouble finding someone available to you? No   Have you been bothered in the last four weeks by sexual problems? No   Do you have difficulty concentrating, remembering or making decisions? No       Health Habits  Current Diet: Well Balanced Diet  Dental Exam: Not up to date  Eye Exam: Not up to date  Exercise (times per week): 3 times per week  Current Exercises Include: Swimming  Age-appropriate Screening Schedule:  Refer to the list below for future screening recommendations based on patient's age, sex and/or medical conditions. Orders for these recommended tests are listed in the plan section. The patient has been provided with a written plan.  Health Maintenance   Topic Date Due    BMI FOLLOWUP  Never done    ZOSTER VACCINE (2 of 2) 11/04/2019    MAMMOGRAM  04/30/2022    COVID-19 Vaccine (5 - Pfizer series) 09/30/2022    COLORECTAL CANCER SCREENING  10/17/2022    INFLUENZA VACCINE  10/01/2023    LIPID PANEL  05/26/2024    TDAP/TD VACCINES (2 - Td or Tdap) 06/05/2024    ANNUAL WELLNESS VISIT  09/06/2024    PAP SMEAR  08/06/2026    HEPATITIS C SCREENING  Completed    Pneumococcal Vaccine 0-64  Aged Out        Assessment & Plan   CMS Preventative Services Quick Reference  Risk Factors Identified During Encounter  Chronic Pain:  Sees pain managemetn  Immunizations Discussed/Encouraged: COVID19  When the new one comes out.  The above risks/problems have been discussed with the patient.  Follow up actions/plans if indicated are seen below in the Assessment/Plan Section.  Pertinent information has been shared with the patient in the After Visit Summary a copy of which has  been given/sent to the patient.   Follow Up:   Follow Up Medicare Visit in one year    Assessment and Plan     Patient Instructions    Problem List Items Addressed This Visit          Gastrointestinal Abdominal     Butler's esophagus    Overview     OVERVIEW:  Noted 11/1/2017 again w/EGD but pre-Butler's Description: [facility]         Relevant Orders    Ambulatory Referral to Gastroenterology     Other Visit Diagnoses       Neutropenia, unspecified type    -  Primary    Could be related to recent COVID infection. (Aug 18)    Relevant Orders    CBC & Differential    Bradycardia        Relevant Orders    Ambulatory Referral to Cardiology    recent COVID-19 virus infection        three weeks ago    Palpitations        Relevant Orders    TSH    ECG 12 Lead    Ambulatory Referral to Cardiology    Encounter for screening for malignant neoplasm of colon        Relevant Orders    Ambulatory Referral to Gastroenterology    Encounter for screening mammogram for malignant neoplasm of breast        Relevant Orders    Mammo Screening Digital Tomosynthesis Bilateral With CAD               Return in about 1 year (around 9/6/2024).          Jaimee is a 58 y.o. being seen today for  Annual Exam   Subjective   HPI  She feels exhausted all the time. Had swelling and rash on her MCP joints and has an appointment with a rheumatologist. Dr. Ayala.     Please describe your symptoms. Overall feeling of low energy.  At times when I sit down I feel like I’m going to fall asleep.  Swelling in legs .  Had rash on hands, gone now.  Some shortness of breath.  Had abnormal lab at pain med md office.  Low wbc, low neutrophil, low eosinophils.   (Had Covid a week before lab draw).  Positive rnp.  Having a lot of palpations.    Also need medication review.     Social History  She  reports that she quit smoking about 3 years ago. Her smoking use included cigarettes. She has a 15.00 pack-year smoking history. She has been exposed to  tobacco smoke. She has never used smokeless tobacco. She reports current alcohol use. She reports that she does not use drugs.  Objective   Vital Signs        BP Readings from Last 1 Encounters:   09/06/23 130/82     Wt Readings from Last 3 Encounters:   09/06/23 73.9 kg (162 lb 14.4 oz)   06/13/23 75.3 kg (166 lb 1.6 oz)   02/13/23 73 kg (161 lb)   Body mass index is 25.51 kg/m².   Physical Exam  Vitals reviewed.   Constitutional:       Appearance: Normal appearance. She is well-developed.   Cardiovascular:      Rate and Rhythm: Regular rhythm. Bradycardia present.      Heart sounds: Normal heart sounds.   Pulmonary:      Effort: Pulmonary effort is normal.      Breath sounds: Normal breath sounds.   Musculoskeletal:         General: Swelling (metatarsal heads) present.      Right lower leg: Edema present.      Left lower leg: Edema present.      Comments: TRACE   Neurological:      Mental Status: She is alert.   Psychiatric:         Behavior: Behavior normal.         Thought Content: Thought content normal.         Judgment: Judgment normal.     BMI is >= 25 and <30. (Overweight) The following options were offered after discussion;: none (medical contraindication)     ECG 12 Lead    Date/Time: 9/6/2023 11:10 AM  Performed by: Saumya Hurd MD  Authorized by: Saumya Hurd MD   Comparison: not compared with previous ECG   Previous ECG: no previous ECG available  Rhythm: sinus bradycardia  Rate: bradycardic  Conduction: conduction normal  QRS axis: normal    Clinical impression: abnormal EKG  Comments: ?LVH      REVIEWED labs done by pain management. Low WBC of undetermined significance. Will repeat today            Patient was given instructions and counseling regarding her condition or for health maintenance advice. Please see specific information pulled into the AVS if appropriate.

## 2023-09-07 LAB
BASOPHILS # BLD AUTO: 0.07 10*3/MM3 (ref 0–0.2)
BASOPHILS NFR BLD AUTO: 1.6 % (ref 0–1.5)
EOSINOPHIL # BLD AUTO: 0 10*3/MM3 (ref 0–0.4)
EOSINOPHIL NFR BLD AUTO: 0 % (ref 0.3–6.2)
ERYTHROCYTE [DISTWIDTH] IN BLOOD BY AUTOMATED COUNT: 12.2 % (ref 12.3–15.4)
HCT VFR BLD AUTO: 38.1 % (ref 34–46.6)
HGB BLD-MCNC: 12.4 G/DL (ref 12–15.9)
IMM GRANULOCYTES # BLD AUTO: 0 10*3/MM3 (ref 0–0.05)
IMM GRANULOCYTES NFR BLD AUTO: 0 % (ref 0–0.5)
LYMPHOCYTES # BLD AUTO: 1.44 10*3/MM3 (ref 0.7–3.1)
LYMPHOCYTES NFR BLD AUTO: 32.4 % (ref 19.6–45.3)
MCH RBC QN AUTO: 28.6 PG (ref 26.6–33)
MCHC RBC AUTO-ENTMCNC: 32.5 G/DL (ref 31.5–35.7)
MCV RBC AUTO: 87.8 FL (ref 79–97)
MONOCYTES # BLD AUTO: 0.4 10*3/MM3 (ref 0.1–0.9)
MONOCYTES NFR BLD AUTO: 9 % (ref 5–12)
NEUTROPHILS # BLD AUTO: 2.53 10*3/MM3 (ref 1.7–7)
NEUTROPHILS NFR BLD AUTO: 57 % (ref 42.7–76)
NRBC BLD AUTO-RTO: 0 /100 WBC (ref 0–0.2)
PLATELET # BLD AUTO: 244 10*3/MM3 (ref 140–450)
RBC # BLD AUTO: 4.34 10*6/MM3 (ref 3.77–5.28)
TSH SERPL DL<=0.005 MIU/L-ACNC: 3.99 UIU/ML (ref 0.27–4.2)
WBC # BLD AUTO: 4.44 10*3/MM3 (ref 3.4–10.8)

## 2023-09-13 ENCOUNTER — DOCUMENTATION (OUTPATIENT)
Dept: BEHAVIORAL HEALTH | Facility: CLINIC | Age: 58
End: 2023-09-13
Payer: MEDICARE

## 2023-09-17 DIAGNOSIS — I10 BENIGN ESSENTIAL HYPERTENSION: ICD-10-CM

## 2023-09-18 ENCOUNTER — PREP FOR SURGERY (OUTPATIENT)
Dept: SURGERY | Facility: SURGERY CENTER | Age: 58
End: 2023-09-18
Payer: MEDICARE

## 2023-09-18 ENCOUNTER — OFFICE VISIT (OUTPATIENT)
Dept: GASTROENTEROLOGY | Facility: CLINIC | Age: 58
End: 2023-09-18
Payer: MEDICARE

## 2023-09-18 VITALS
TEMPERATURE: 97.5 F | BODY MASS INDEX: 25.51 KG/M2 | DIASTOLIC BLOOD PRESSURE: 78 MMHG | WEIGHT: 162.5 LBS | HEART RATE: 61 BPM | HEIGHT: 67 IN | SYSTOLIC BLOOD PRESSURE: 120 MMHG | OXYGEN SATURATION: 98 %

## 2023-09-18 DIAGNOSIS — R13.19 ESOPHAGEAL DYSPHAGIA: ICD-10-CM

## 2023-09-18 DIAGNOSIS — Z12.11 ENCOUNTER FOR SCREENING FOR MALIGNANT NEOPLASM OF COLON: ICD-10-CM

## 2023-09-18 DIAGNOSIS — Z86.010 PERSONAL HISTORY OF COLONIC POLYPS: ICD-10-CM

## 2023-09-18 DIAGNOSIS — Z80.0 FAMILY HISTORY OF COLON CANCER: ICD-10-CM

## 2023-09-18 DIAGNOSIS — K59.09 OTHER CONSTIPATION: ICD-10-CM

## 2023-09-18 DIAGNOSIS — K22.70 BARRETT'S ESOPHAGUS WITHOUT DYSPLASIA: Primary | ICD-10-CM

## 2023-09-18 RX ORDER — IRBESARTAN 150 MG/1
TABLET ORAL
Qty: 90 TABLET | Refills: 1 | Status: SHIPPED | OUTPATIENT
Start: 2023-09-18

## 2023-09-18 RX ORDER — SODIUM CHLORIDE 0.9 % (FLUSH) 0.9 %
10 SYRINGE (ML) INJECTION AS NEEDED
OUTPATIENT
Start: 2023-09-18

## 2023-09-18 RX ORDER — SODIUM CHLORIDE 0.9 % (FLUSH) 0.9 %
3 SYRINGE (ML) INJECTION EVERY 12 HOURS SCHEDULED
OUTPATIENT
Start: 2023-09-18

## 2023-09-18 RX ORDER — SODIUM CHLORIDE, SODIUM LACTATE, POTASSIUM CHLORIDE, CALCIUM CHLORIDE 600; 310; 30; 20 MG/100ML; MG/100ML; MG/100ML; MG/100ML
30 INJECTION, SOLUTION INTRAVENOUS CONTINUOUS PRN
OUTPATIENT
Start: 2023-09-18

## 2023-09-18 NOTE — PROGRESS NOTES
"Chief Complaint   Patient presents with    Heartburn     Barretts esophagus, colon polyps           History of Present Illness  58-year-old female presents today for acid reflux, she has a history of Butler's esophagus and colon polyps, last EGD and colonoscopy October 30, 2017.  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    She will experience acid reflux that comes and goes.  She will experience esophageal dysphagia with a feeling of bulkier food stuck in her mid esophagus, this will pass with liquid wash.  No forced regurgitation.    Bowel movements are regular with generic over-the-counter Dulcolax once per day.  She denies rectal bleeding or melanic stools.  Her weight is stable.      Result Review :         CBC & Differential (09/06/2023 11:22)  Office Visit with Saumya Hurd MD (09/06/2023)    Vital Signs:   /78   Pulse 61   Temp 97.5 °F (36.4 °C)   Ht 170.2 cm (67\")   Wt 73.7 kg (162 lb 8 oz)   SpO2 98%   BMI 25.45 kg/m²     Body mass index is 25.45 kg/m².     Physical Exam  Vitals reviewed.   Constitutional:       General: She is not in acute distress.     Appearance: Normal appearance. She is not ill-appearing.   Eyes:      General: No scleral icterus.  Pulmonary:      Effort: Pulmonary effort is normal. No respiratory distress.   Abdominal:      General: Bowel sounds are normal. There is no distension.      Palpations: Abdomen is soft. Abdomen is not rigid. There is no pulsatile mass.      Tenderness: There is no abdominal tenderness. There is no guarding or rebound.   Skin:     Coloration: Skin is not jaundiced.   Neurological:      Mental Status: She is alert and oriented to person, place, and time.   Psychiatric:         Thought Content: Thought content normal.         Judgment: Judgment normal.           Assessment and Plan    Diagnoses and all orders for this " visit:    1. Butler's esophagus without dysplasia (Primary)    2. Other constipation    3. Personal history of colonic polyps    4. Family history of colon cancer    5. Encounter for screening for malignant neoplasm of colon       History of colon polyps, family history of colon cancer, due for screening colonoscopy, last 2017, orders placed.    History of Butler's esophagus and acid reflux with intermittent esophageal dysphagia with bulky foods, orders placed for EGD.    Continue omeprazole daily.    Bowel movements are controlled with Dulcolax, continue daily.    Additional recommendations will be made based on results of the above work-up and clinical course.  Recommend follow-up visit after EGD and colonoscopy have been performed.          Patient Instructions   Continue omeprazole daily  Continue dulcolax daily for bowel movements    Schedule EGD and colonoscopy, orders placed.    Additional recommendations will be made based on results of EGD and colonoscopy findings.    Follow-up visit after procedures to discuss results and make any additional recommendations.         EMR Dragon/Transcription Disclaimer:  This document has been Dictated utilizing Dragon dictation.

## 2023-09-18 NOTE — PATIENT INSTRUCTIONS
Continue omeprazole daily  Continue dulcolax daily for bowel movements    Schedule EGD and colonoscopy, orders placed.    Additional recommendations will be made based on results of EGD and colonoscopy findings.    Follow-up visit after procedures to discuss results and make any additional recommendations.

## 2023-09-22 ENCOUNTER — PATIENT ROUNDING (BHMG ONLY) (OUTPATIENT)
Dept: GASTROENTEROLOGY | Facility: CLINIC | Age: 58
End: 2023-09-22
Payer: MEDICARE

## 2023-09-22 NOTE — PROGRESS NOTES
[facility]  [address] [address] [phone].    Before we get started may I verify your date of birth? 1965    I am calling to officially welcome you to our practice and ask about your recent visit. Is this a good time to talk? yes    Tell me about your visit with us. What things went well?  Everything was great. Check in and check out was quick. My visit went well and Carolyn was thorough and attentive. Visit went well.        We're always looking for ways to make our patients' experiences even better. Do you have recommendations on ways we may improve?  no    Overall were you satisfied with your first visit to our practice? yes       I appreciate you taking the time to speak with me today. Is there anything else I can do for you? no      Thank you, and have a great day.

## 2023-09-25 ENCOUNTER — TELEMEDICINE (OUTPATIENT)
Dept: BEHAVIORAL HEALTH | Facility: CLINIC | Age: 58
End: 2023-09-25

## 2023-09-25 DIAGNOSIS — F41.1 GAD (GENERALIZED ANXIETY DISORDER): ICD-10-CM

## 2023-09-25 DIAGNOSIS — M15.9 GENERALIZED OSTEOARTHRITIS: ICD-10-CM

## 2023-09-25 DIAGNOSIS — Z79.899 HIGH RISK MEDICATION USE: ICD-10-CM

## 2023-09-25 DIAGNOSIS — G47.00 INSOMNIA, UNSPECIFIED TYPE: ICD-10-CM

## 2023-09-25 DIAGNOSIS — F33.1 MDD (MAJOR DEPRESSIVE DISORDER), RECURRENT EPISODE, MODERATE: Primary | ICD-10-CM

## 2023-09-25 RX ORDER — DULOXETIN HYDROCHLORIDE 60 MG/1
60 CAPSULE, DELAYED RELEASE ORAL DAILY
Qty: 30 CAPSULE | Refills: 2 | Status: SHIPPED | OUTPATIENT
Start: 2023-09-25

## 2023-09-25 NOTE — PROGRESS NOTES
Patient assessed today via MyChart through EPIC pt is at home in a secure environment and verbalizes privacy during interview. LINDA Ovalle is at home in a secure environment using a secure laptop.The patient's condition being diagnosed/treated is appropriate for telemedicine.The provider identified himself as well as his credentials.The patient, and/or patient's guardian, consent to be seen remotely, and when consent is given they understand that the consent allows for patient identifiable information to be sent to a third party as needed. They may refuse to be seen remotely at any time. The electronic data is encrypted and password protected, and the patient and/or guardian has been advised of the potential risks to privacy not withstanding such measures.    [facility] PROGRESS NOTE  [facility]  [address] [place] [address]    NAME: Jaimee Leonardo     : 1965   MRN: [id number]   PCP: Saumya Hurd MD     DATE: 2023    ALLERGY:Bee venom, Morphine and related, and Rofecoxib     MEDICATIONS:  baclofen  clonazePAM  DULoxetine  gabapentin  irbesartan  MULTIPLE VITAMINS ESSENTIAL PO  omeprazole  oxyCODONE  rizatriptan  traZODone     VITALS: There were no vitals taken for this visit. No LMP recorded. Patient is postmenopausal.     Subjective     Chief Complaint   Patient presents with    Depression    Anxiety    Follow-up      HPI:  58 y.o. female patient seen for follow up.  Patient last seen 1 month ago, no medication changes were made at that time and patient was instructed to continue all psychiatric meds, since that time patient reports everything has been going well may be slightly better, reports she feels less flat, has less days where she feels unmotivated, reports her youngest son who has his own mental health problems is doing fairly well, still in the process of looking for a mental health provider/therapist in the [facility] area, he continues to live in the [place]  house.  Poor sleep reported X 4 days, tied to situational stress, is taking 2 trazodone's at bedtime, takes Cymbalta every day, takes Klonopin every day.  Patient not interested in seeing a therapist, reports she has an upcoming EGD/colonoscopy due to history of Butler's esophagus as well as significant family history of colon CA, gets little to no exercise due to chronic pain, tries to go on walks (grass easier on joints) tries to visit farmhouse and spend time with horses.    Social Status:  Ethnic Group: Not  Or   Race: White Or   Marital Status:    Employment status: Disabled        SUBSTANCE/SEXUAL HISTORY:   reports that she quit smoking about 4 years ago. Her smoking use included cigarettes. She has a 15.00 pack-year smoking history. She has been exposed to tobacco smoke. She has never used smokeless tobacco. She reports current alcohol use. She reports that she does not use drugs.   reports being sexually active and has had partner(s) who are male. She reports using the following method of birth control/protection: None.      FAMILY HISTORY:  family history includes Alcohol abuse in her paternal grandfather and son; Alzheimer's disease in her father; Anxiety disorder in her mother, son, and son; Arthritis in her father and mother; Birth defects in her son; Bone cancer in her father; Cancer in her father and maternal grandfather; Colon cancer in her maternal grandfather; Colon polyps in her mother; Depression in her son; Heart disease in her mother; Hyperlipidemia in her maternal grandfather and mother; Irritable bowel syndrome in her son; Stroke in her mother; Vision loss in her mother.     PAST MEDICAL HISTORY   has a past medical history of Allergic, Anxiety, Arthritis, Butler esophagus, Colon polyp, Depression, Eye inflammation, GERD (gastroesophageal reflux disease), Headache, Heart murmur, Hyperlipidemia, Hypertension, Irritable bowel syndrome, Low back pain, Tremor, and  Visual impairment.     Review of Systems   Constitutional: Negative.    Respiratory:  Negative for chest tightness and shortness of breath.    Cardiovascular:  Negative for chest pain.   Gastrointestinal:  Positive for constipation and diarrhea. Negative for vomiting.   Musculoskeletal:  Positive for back pain.   Neurological:  Negative for dizziness and light-headedness.   Psychiatric/Behavioral:  Positive for sleep disturbance and stress. Negative for suicidal ideas. The patient is nervous/anxious.      Objective   Physical Exam  Constitutional:       Appearance: Normal appearance.   HENT:      Head: Normocephalic.   Pulmonary:      Effort: Pulmonary effort is normal.   Skin:     General: Skin is dry.   Neurological:      General: No focal deficit present.      Mental Status: She is alert and oriented to person, place, and time.   Psychiatric:         Mood and Affect: Mood normal.         Behavior: Behavior normal.         Thought Content: Thought content normal.     PHQ-9 Depression Screening  Little interest or pleasure in doing things? (P) 1-->several days   Feeling down, depressed, or hopeless? (P) 1-->several days   Trouble falling or staying asleep, or sleeping too much? (P) 2-->more than half the days   Feeling tired or having little energy?     Poor appetite or overeating? (P) 0-->not at all   Feeling bad about yourself/you are a failure/have let yourself or your family down? (P) 1-->several days   Trouble concentrating on things? (P) 0-->not at all   Psychomotor agitation/retardation (P) 0-->not at all   Thoughts about death/dying/suicide (P) 0-->not at all   PHQ-9 Total Score (P) 7   How difficult have these problems for you? (P) not difficult at all     GAD7 Documentation:  Feeling nervous, anxious or on edge (P) 1   Not being able to stop or control worrying (P) 1   Worrying too much about different things (P) 1   Trouble relaxing (P) 1   Being so restless that it is hard to sit still (P) 0   Becoming  easily annoyed or irritable (P) 1   Feeling Afraid as if something awful might happen (P) 0   JANELLE Total Score (P) 5   How difficult have these problems made it for you? (P) Somewhat difficult     MENTAL STATUS EXAM   General Appearance:  Cleanly groomed and dressed  Eye Contact:  Good eye contact  Attitude:  Cooperative  Speech:  Normal rate, tone, volume  Mood and affect:  Normal, pleasant  Thought Process:  Goal-directed  Associations/ Thought Content:  No delusions  Hallucinations:  None  Suicidal Ideations:  Not present  Homicidal Ideation:  Not present  Sensorium:  Alert  Orientation:  Person, place, time and situation  Attention Span/ Concentration:  Good  Fund of Knowledge:  Appropriate for age and educational level  Intellectual Functioning:  Above average  Insight:  Good  Judgement:  Good  Reliability:  Good  Impulse Control:  Good     LABS:  CBC          5/26/2023    10:09 8/25/2023    13:15 9/6/2023    11:22   CBC   WBC 4.19  3.13  4.44    RBC 4.26  4.21  4.34    Hemoglobin 12.6  12.3  12.4    Hematocrit 38.4  37.4  38.1    MCV 90.1  88.8  87.8    MCH 29.6  29.2  28.6    MCHC 32.8  32.9  32.5    RDW 12.6  12.5  12.2    Platelets 262  235  244       CMP          5/26/2023    10:09   CMP   Glucose 110    BUN 11    Creatinine 0.59    Sodium 140    Potassium 5.4    Chloride 103    Calcium 10.4    Total Protein 6.7    Albumin 4.7    Globulin 2.0    Total Bilirubin 0.3    Alkaline Phosphatase 67    AST (SGOT) 22    ALT (SGPT) 17    BUN/Creatinine Ratio 18.6         Assessment    ASSESSMENT/TREATMENT PLAN/INSTRUCTIONS/EDUCATION    (F33.1) MDD (major depressive disorder), recurrent episode, moderate - Plan: DULoxetine (CYMBALTA) 60 MG capsule    (F41.1) JANELLE (generalized anxiety disorder) - Plan: DULoxetine (CYMBALTA) 60 MG capsule    (G47.00) Insomnia, unspecified type    (M15.9) Generalized osteoarthritis - Plan: DULoxetine (CYMBALTA) 60 MG capsule    (Z79.899) High risk medication use (benzo for anxiety)      -MDD/JANELLE: Improved/stable with treatment.  -Insomnia: Worse on/off tied to situational stress + chronic pain +  medical issues.   -High medication use: Patient is a retired nurse, has been educated on risk of combining benzodiazepines and opiates together.    AVS INSTRUCTIONS:    Medication changes:    Continue Cymbalta 60 mg a day as previously ordered  Continue trazodone as needed at bedtime, patient currently taking 2 tablets, can increase to 3 if needed. Be in bed within 30 minutes, give yourself a 7 to 8-hour time window devoted to sleep, do not take medication then drive a car or do something where you can injure yourself, do not mix with alcohol or other sedating medications.  Next morning grogginess is expected this should be mild and go away on its own.  Okay to try over-the-counter melatonin, the oral disintegrating tablet that is grape flavored works well for a lot of people.    Increase positive coping skills discussed today for overall mental health/stress including light/moderate exercise, swimming, time on the farm, time with horses, fall activities.    If insomnia continues to worsen, consider switching to alternative agent (doxepin or elavil), or adding CBT/cognitive behavioral therapy, patient educated that CBT for insomnia has been shown to be most beneficial. Please read attached handout on better sleep hygiene practices. Cut back on diet coke/caffeine.    Return in 3 months (on 12/25/2023) for Medication Check, Video visit.    PSYCHOTHERAPY:  In/Start Time: 1100.  Out/Stop Time: 1116.  16 minutes of face to face direct patient care with patient was spent for supportive psychotherapy including strengthening self awareness and insights, strengthening coping skills, counseling the patient regarding diagnoses, and utilizing cognitive behavioral therapy to assist the patient in recognizing more appropriate coping mechanisms which are proven effective in reducing the severity of frequency  of symptoms.  This APRN assisted the patient in processing the patient's diagnoses, and also acknowledged and normalized the patient's thoughts, feelings, and concerns This APRN allowed the patient to freely discuss issues without interruption or judgment.      MEDICATION ISSUES:  MONET: Reviewed during F/U appt's via interface.    Medications Discontinued During This Encounter   Medication Reason    DULoxetine (CYMBALTA) 60 MG capsule Reorder     New Medications Ordered This Visit   Medications    DULoxetine (CYMBALTA) 60 MG capsule     Sig: Take 1 capsule by mouth Daily.     Dispense:  30 capsule     Refill:  2      No orders of the defined types were placed in this encounter.    Barriers: medically complex, high risk medication and stress  Strengths:  motivated , positive attitude and knowledgeable about condition/medications/disease course     -Progress toward goal:  At goal  -Functional Status: Moderate impairment   -Prognosis: Fair with Ongoing Treatment        Part of this note may be an electronic transcription/translation of spoken language to printed text using the Dragon Dictation System. Some of the data in this electronic note has been brought forward from a previous encounter, any necessary changes have been made, it has been reviewed by this APRN, and it is accurate.    This document has been electronically signed by NAOMI Bocanegra September 25, 2023 12:01 EDT

## 2023-09-25 NOTE — PATIENT INSTRUCTIONS
Medication changes:    Continue Cymbalta 60 mg a day as previously ordered  Continue trazodone as needed at bedtime, patient currently taking 2 tablets, can increase to 3 if needed. Be in bed within 30 minutes, give yourself a 7 to 8-hour time window devoted to sleep, do not take medication then drive a car or do something where you can injure yourself, do not mix with alcohol or other sedating medications.  Next morning grogginess is expected this should be mild and go away on its own.  Okay to try over-the-counter melatonin, the oral disintegrating tablet that is grape flavored works well for a lot of people.    Increase positive coping skills discussed today for overall mental health/stress including light/moderate exercise, swimming, time on the farm, time with horses, fall activities.    If insomnia continues to worsen, consider switching to alternative agent (doxepin or elavil), or adding CBT/cognitive behavioral therapy, patient educated that CBT for insomnia has been shown to be most beneficial.  Please read attached handout on better sleep hygiene practices. Cut back on diet coke/caffeine.    GENERAL NEW PATIENT INSTRUCTIONS:  -The best way to get a hold of Vasquez is to call the office at [phone] and ask to leave a message with his medical assistant.  Patient's are not able to message their mental health provider directly via DirectRM, please give my office up to 48 hours to respond to a patient call/question/refill request.  -Please call 911 or go to the nearest ER if you begin to have thoughts of harming yourself or other people.  -Refill requests will be made during normal office hours, Monday-Friday 8:00-5:30.  Vasquez is out of the office on Wednesdays and weekends.  Follow-up appointments must be maintained in order for prescribing to continue.    -Tuesdays and Thursdays are Vasquez's in-office days, Mondays and Fridays are his telehealth days.  The decision to be seen virtually or in person is up to the  discretion of the provider, not all behavioral health problems are appropriate for telehealth.    NO SHOW POLICY:  We understand unexpected circumstances arise; however, anytime you miss your appointment we are unable to provide you appropriate care.  In addition, each appointment missed could have been used to provide care for others.  We ask that you call at least 24 hours in advance to cancel or reschedule an appointment. We would like to take this opportunity to remind you of our policy stating patients who miss THREE or more appointments without cancelling or rescheduling 24 hours in advance of the appointment may be subject to cancellation of any further visits with our clinic and recommendation to seek in-person services/visits. Please call [phone] to reschedule your appointment. If there are reasons that make it difficult for you to keep the appointments, please call and let us know how we can help. Please understand that medication prescribing will not continue without seeing your provider.       [facility]   [address] [place] [address]  P: [phone]  F: [phone]

## 2023-09-26 DIAGNOSIS — F41.1 GAD (GENERALIZED ANXIETY DISORDER): ICD-10-CM

## 2023-09-26 DIAGNOSIS — F33.1 MDD (MAJOR DEPRESSIVE DISORDER), RECURRENT EPISODE, MODERATE: ICD-10-CM

## 2023-09-26 DIAGNOSIS — M15.9 GENERALIZED OSTEOARTHRITIS: ICD-10-CM

## 2023-09-26 RX ORDER — DULOXETIN HYDROCHLORIDE 60 MG/1
60 CAPSULE, DELAYED RELEASE ORAL DAILY
Qty: 30 CAPSULE | Refills: 2 | OUTPATIENT
Start: 2023-09-26

## 2023-10-06 ENCOUNTER — OFFICE VISIT (OUTPATIENT)
Dept: CARDIOLOGY | Facility: CLINIC | Age: 58
End: 2023-10-06
Payer: MEDICARE

## 2023-10-06 VITALS
BODY MASS INDEX: 25.71 KG/M2 | DIASTOLIC BLOOD PRESSURE: 80 MMHG | WEIGHT: 163.8 LBS | HEIGHT: 67 IN | HEART RATE: 60 BPM | SYSTOLIC BLOOD PRESSURE: 110 MMHG | OXYGEN SATURATION: 99 %

## 2023-10-06 DIAGNOSIS — R06.02 EXERTIONAL SHORTNESS OF BREATH: ICD-10-CM

## 2023-10-06 DIAGNOSIS — R73.9 HYPERGLYCEMIA: ICD-10-CM

## 2023-10-06 DIAGNOSIS — R00.2 PALPITATIONS: Primary | ICD-10-CM

## 2023-10-06 PROCEDURE — 3079F DIAST BP 80-89 MM HG: CPT | Performed by: INTERNAL MEDICINE

## 2023-10-06 PROCEDURE — 3074F SYST BP LT 130 MM HG: CPT | Performed by: INTERNAL MEDICINE

## 2023-10-06 PROCEDURE — 99204 OFFICE O/P NEW MOD 45 MIN: CPT | Performed by: INTERNAL MEDICINE

## 2023-10-06 RX ORDER — DIPHENHYDRAMINE HCL 25 MG
25 CAPSULE ORAL EVERY 6 HOURS PRN
COMMUNITY

## 2023-10-06 RX ORDER — EPINEPHRINE 0.3 MG/.3ML
0.3 INJECTION SUBCUTANEOUS
COMMUNITY

## 2023-10-06 RX ORDER — ATORVASTATIN CALCIUM 20 MG/1
20 TABLET, FILM COATED ORAL DAILY
Qty: 90 TABLET | Refills: 3 | Status: SHIPPED | OUTPATIENT
Start: 2023-10-06

## 2023-10-06 RX ORDER — BISACODYL 5 MG/1
5 TABLET, DELAYED RELEASE ORAL DAILY PRN
COMMUNITY

## 2023-10-06 NOTE — PROGRESS NOTES
PATIENTINFORMATION    Date of Office Visit: 10/06/2023  Encounter Provider: Aldo Mary MD  Place of Service: [facility] CARDIOLOGY  Patient Name: Jaimee Leonardo  : 1965    Subjective:     Encounter Date:10/06/2023      Patient ID: Jaimee Leonardo is a 58 y.o. female.    No chief complaint on file.    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  Scribed palpitations as heart fluttering and beating fast lasting for few seconds and not very long.  No other associated symptoms like presyncope.  Occurs almost every day without any known exacerbating or relieving factor.  She has noted worsening exertional shortness of breath over the last 2 to 3 months with less activity tolerance.  She has trace leg swelling.  She denies any orthopnea, PND.  She has intermittent lightheadedness when changing position that she attributes to orthostatic hypotension.  Currently on irbesartan 150 mg p.o. daily.    No prior stress testing or coronary angiogram.    Past medical history significant for complicated back surgery with peripheral neuropathy and chronic pain that she follows up with pain medicine.  She also has prior knee and neck surgery.  Joint problems interferes with her activities and unable to walk for extended time because of pain      ROS  All systems reviewed and negative except as noted in HPI.    Past Medical History:   Diagnosis Date    Allergic     Anxiety     Arthritis     Butler esophagus     Bradycardia     Colon polyp     Depression     Eye inflammation     GERD (gastroesophageal reflux disease)     Headache     Heart murmur     Hyperlipidemia     Hypertension     Irritable bowel syndrome     Low back pain     Palpitation     Tremor     Visual impairment        Past Surgical History:   Procedure Laterality  "Date    BACK SURGERY      CERVICAL DISC SURGERY  2017    C5-6 revision and C6-7 ACDF    COLONOSCOPY      ENDOMETRIAL ABLATION      JOINT REPLACEMENT      SPINE SURGERY      TRIGGER FINGER RELEASE Bilateral     TUBAL ABDOMINAL LIGATION      half    UPPER GASTROINTESTINAL ENDOSCOPY         Social History     Socioeconomic History    Marital status:    Tobacco Use    Smoking status: Former     Packs/day: 0.50     Years: 30.00     Pack years: 15.00     Types: Cigarettes     Quit date: 9/15/2019     Years since quittin.0     Passive exposure: Past    Smokeless tobacco: Never   Vaping Use    Vaping Use: Never used   Substance and Sexual Activity    Alcohol use: Yes     Comment: Maybe once a month    Drug use: No    Sexual activity: Yes     Partners: Male     Birth control/protection: None       Family History   Problem Relation Age of Onset    Colon polyps Mother     Stroke Mother     Anxiety disorder Mother     Arthritis Mother     Heart disease Mother         Atrial fibrillation    Hyperlipidemia Mother     Vision loss Mother         Glaucoma    Bone cancer Father     Alzheimer's disease Father     Arthritis Father     Cancer Father         Multiple myeloma    Colon cancer Maternal Grandfather     Cancer Maternal Grandfather         Colon    Hyperlipidemia Maternal Grandfather     Alcohol abuse Paternal Grandfather     Alcohol abuse Son     Anxiety disorder Son     Birth defects Son     Depression Son     Irritable bowel syndrome Son     Anxiety disorder Son     Crohn's disease Neg Hx     Ulcerative colitis Neg Hx          Procedures       Objective:     /80   Pulse 60   Ht 170.2 cm (67\")   Wt 74.3 kg (163 lb 12.8 oz)   SpO2 99%   BMI 25.65 kg/m²  Body mass index is 25.65 kg/m².     Constitutional:       General: Not in acute distress.     Appearance: Well-developed. Not diaphoretic.   Eyes:      Pupils: Pupils are equal, round, and reactive to light.   HENT:      Head: Normocephalic and " atraumatic.   Neck:      Thyroid: No thyromegaly.   Pulmonary:      Effort: Pulmonary effort is normal. No respiratory distress.      Breath sounds: Normal breath sounds. No wheezing. No rales.   Chest:      Chest wall: Not tender to palpatation.   Cardiovascular:      Normal rate. Regular rhythm.      No gallop.    Pulses:     Intact distal pulses.   Edema:     Peripheral edema absent.   Abdominal:      General: Bowel sounds are normal. There is no distension.      Palpations: Abdomen is soft.      Tenderness: There is no guarding.   Musculoskeletal: Normal range of motion.         General: No deformity.      Cervical back: Normal range of motion and neck supple. Skin:     General: Skin is warm and dry.      Findings: No rash.   Neurological:      Mental Status: Alert and oriented to person, place, and time.      Cranial Nerves: No cranial nerve deficit.      Deep Tendon Reflexes: Reflexes are normal and symmetric.   Psychiatric:         Judgment: Judgment normal.       Review Of Data: I have reviewed pertinent recent labs, images and documents and pertinent findings included in HPI or assessment below.    Lipid Panel          5/26/2023    10:09   Lipid Panel   Total Cholesterol 234    Triglycerides 85    HDL Cholesterol 67    VLDL Cholesterol 15    LDL Cholesterol  152    LDL/HDL Ratio 2.24          Assessment/Plan:         Exertional shortness of breath-only trace pedal edema otherwise euvolemic on exam.  No other evidence for CHF.  She has risk factor for CAD including untreated hypercholesterolemia, hypertension.  Palpitations-sound benign  Essential hypertension with intermittent orthostatic symptoms-office blood pressure was 110/80.  Decrease irbesartan to 75 mg p.o. daily.  She will check her blood pressure at home and increase dose as needed if blood pressure is above 130/80 mmHg.  Hypercholesterolemia     proBNP, A1c, myocardial perfusion study, Holter monitor  She is agreeable to be started on  atorvastatin    Diagnosis and plan of care discussed with patient and verbalized understanding.            Your medication list            Accurate as of October 6, 2023  3:15 PM. If you have any questions, ask your nurse or doctor.                START taking these medications        Instructions Last Dose Given Next Dose Due   atorvastatin 20 MG tablet  Commonly known as: LIPITOR  Started by: Aldo Mary MD      Take 1 tablet by mouth Daily.              CONTINUE taking these medications        Instructions Last Dose Given Next Dose Due   baclofen 10 MG tablet  Commonly known as: LIORESAL           bisacodyl 5 MG EC tablet  Commonly known as: DULCOLAX      Take 1 tablet by mouth Daily As Needed for Constipation.       clonazePAM 0.5 MG tablet  Commonly known as: KlonoPIN      TAKE 1/2 TABLET BY MOUTH EVERY MORNING AND TAKE ONE TABLET BY MOUTH EVERY EVENING       diphenhydrAMINE 25 mg capsule  Commonly known as: BENADRYL      Take 1 capsule by mouth Every 6 (Six) Hours As Needed for Itching.       DULoxetine 60 MG capsule  Commonly known as: CYMBALTA      Take 1 capsule by mouth Daily.       EpiPen 2-Uri 0.3 MG/0.3ML solution auto-injector injection  Generic drug: EPINEPHrine      0.3 mL.       gabapentin 600 MG tablet  Commonly known as: NEURONTIN      1 tablet 3 (Three) Times a Day.       irbesartan 150 MG tablet  Commonly known as: AVAPRO      TAKE 1 TABLET BY MOUTH DAILY       multivitamin tablet tablet  Commonly known as: THERAGRAN      Take by mouth.       omeprazole 40 MG capsule  Commonly known as: priLOSEC      TAKE 1 CAPSULE BY MOUTH EVERY DAY       oxyCODONE 5 MG immediate release tablet  Commonly known as: ROXICODONE      1 tablet 3 (Three) Times a Day.       rizatriptan 10 MG tablet  Commonly known as: MAXALT      TAKE 1 TABLET BY MOUTH AT ONSET OF HEADACHE; MAY REPEAT 1 TABLET IN 2 HOURS IF NEEDED       traZODone 50 MG tablet  Commonly known as: DESYREL      TAKE ONE TO TWO TABLETS BY MOUTH  EVERY NIGHT AT BEDTIME AS NEEDED FOR SLEEP                 Where to Get Your Medications        These medications were sent to [id number] - [place], [place] - [address] [facility] [address] - [phone]  - [phone]   [address] [place] [address] [place] [address]      Phone: [phone]   atorvastatin 20 MG tablet             Aldo Mary MD  10/06/23  15:15 EDT

## 2023-10-12 DIAGNOSIS — F41.9 ANXIETY: ICD-10-CM

## 2023-10-12 DIAGNOSIS — F51.01 PRIMARY INSOMNIA: ICD-10-CM

## 2023-10-12 RX ORDER — CLONAZEPAM 0.5 MG/1
TABLET ORAL
Qty: 45 TABLET | Refills: 0 | Status: SHIPPED | OUTPATIENT
Start: 2023-10-12

## 2023-10-31 ENCOUNTER — HOSPITAL ENCOUNTER (OUTPATIENT)
Dept: MAMMOGRAPHY | Facility: HOSPITAL | Age: 58
Discharge: HOME | End: 2023-10-31
Admitting: FAMILY MEDICINE
Payer: MEDICARE

## 2023-10-31 DIAGNOSIS — Z12.31 ENCOUNTER FOR SCREENING MAMMOGRAM FOR MALIGNANT NEOPLASM OF BREAST: ICD-10-CM

## 2023-10-31 PROCEDURE — 77063 BREAST TOMOSYNTHESIS BI: CPT

## 2023-10-31 PROCEDURE — 77067 SCR MAMMO BI INCL CAD: CPT

## 2023-11-02 ENCOUNTER — HOSPITAL ENCOUNTER (OUTPATIENT)
Dept: CARDIOLOGY | Facility: HOSPITAL | Age: 58
Discharge: HOME | End: 2023-11-02
Payer: MEDICARE

## 2023-11-02 VITALS — BODY MASS INDEX: 25.71 KG/M2 | HEIGHT: 67 IN | WEIGHT: 163.8 LBS

## 2023-11-02 DIAGNOSIS — R06.02 EXERTIONAL SHORTNESS OF BREATH: ICD-10-CM

## 2023-11-02 LAB
BH CV NUCLEAR PRIOR STUDY: 2
BH CV REST NUCLEAR ISOTOPE DOSE: 11.6 MCI
BH CV STRESS BP STAGE 1: NORMAL
BH CV STRESS COMMENTS STAGE 1: NORMAL
BH CV STRESS DOSE REGADENOSON STAGE 1: 0.4
BH CV STRESS DURATION MIN STAGE 1: 0
BH CV STRESS DURATION SEC STAGE 1: 10
BH CV STRESS HR STAGE 1: 105
BH CV STRESS NUCLEAR ISOTOPE DOSE: 35.1 MCI
BH CV STRESS PROTOCOL 1: NORMAL
BH CV STRESS RECOVERY BP: NORMAL MMHG
BH CV STRESS RECOVERY HR: 92 BPM
BH CV STRESS STAGE 1: 1
LV EF NUC BP: 71 %
MAXIMAL PREDICTED HEART RATE: 161 BPM
PERCENT MAX PREDICTED HR: 65.22 %
STRESS BASELINE BP: NORMAL MMHG
STRESS BASELINE HR: 72 BPM
STRESS PERCENT HR: 77 %
STRESS POST EXERCISE DUR SEC: 10 SEC
STRESS POST PEAK BP: NORMAL MMHG
STRESS POST PEAK HR: 105 BPM
STRESS TARGET HR: 137 BPM

## 2023-11-02 PROCEDURE — 25010000002 REGADENOSON 0.4 MG/5ML SOLUTION: Performed by: INTERNAL MEDICINE

## 2023-11-02 PROCEDURE — A9502 TC99M TETROFOSMIN: HCPCS | Performed by: INTERNAL MEDICINE

## 2023-11-02 PROCEDURE — 93017 CV STRESS TEST TRACING ONLY: CPT

## 2023-11-02 PROCEDURE — 78452 HT MUSCLE IMAGE SPECT MULT: CPT

## 2023-11-02 PROCEDURE — 0 TECHNETIUM TETROFOSMIN KIT: Performed by: INTERNAL MEDICINE

## 2023-11-02 RX ORDER — REGADENOSON 0.08 MG/ML
0.4 INJECTION, SOLUTION INTRAVENOUS
Status: COMPLETED | OUTPATIENT
Start: 2023-11-02 | End: 2023-11-02

## 2023-11-02 RX ADMIN — TETROFOSMIN 1 DOSE: 1.38 INJECTION, POWDER, LYOPHILIZED, FOR SOLUTION INTRAVENOUS at 13:30

## 2023-11-02 RX ADMIN — REGADENOSON 0.4 MG: 0.08 INJECTION, SOLUTION INTRAVENOUS at 13:30

## 2023-11-02 RX ADMIN — TETROFOSMIN 1 DOSE: 1.38 INJECTION, POWDER, LYOPHILIZED, FOR SOLUTION INTRAVENOUS at 12:40

## 2023-11-07 ENCOUNTER — TELEPHONE (OUTPATIENT)
Dept: CARDIOLOGY | Facility: CLINIC | Age: 58
End: 2023-11-07
Payer: MEDICARE

## 2023-11-07 NOTE — TELEPHONE ENCOUNTER
----- Message from Gokul Kemp II, MA sent at 11/7/2023  2:30 PM EST -----    ----- Message -----  From: Aldo Mary MD  Sent: 11/7/2023   2:08 PM EST  To: Gokul Kemp II, MA    Please notify Ms. Leonardo that stress test does not show significant blockage of heart arteries.  Follow-up in cardiac clinic in 6 months or sooner with any concerning symptoms.  Let me know if she has any questions.  Thank you

## 2023-11-07 NOTE — TELEPHONE ENCOUNTER
Jaimee Leonardo returned call.  Reviewed results and recommendations with patient and she verbalized understanding.    Patient reports she took off her monitor after a couple of days as she developed blisters.  Patient stated that she has been applying neosporin to the sites and that it seems to be healing up.  Patient has not mailed in monitor yet, however asked for the results.  Explained that once monitor is mailed in, results should be available in about 2-3 weeks.  Patient verbalized understanding and stated she will mail in asap.    Patient stated she will call back to schedule 6 month f/u due to insurance issues.     Thank you,  Gianna KC RN  Triage Nurse [facility]   14:47 EST

## 2023-11-07 NOTE — TELEPHONE ENCOUNTER
Called and left VM. Will continue to try to reach patient.     Kymberly Huggins RN  Triage [facility]

## 2023-11-15 ENCOUNTER — ANESTHESIA EVENT (OUTPATIENT)
Dept: SURGERY | Facility: SURGERY CENTER | Age: 58
End: 2023-11-15
Payer: MEDICARE

## 2023-11-15 ENCOUNTER — ANESTHESIA (OUTPATIENT)
Dept: SURGERY | Facility: SURGERY CENTER | Age: 58
End: 2023-11-15
Payer: MEDICARE

## 2023-11-15 ENCOUNTER — HOSPITAL ENCOUNTER (OUTPATIENT)
Facility: SURGERY CENTER | Age: 58
Setting detail: HOSPITAL OUTPATIENT SURGERY
Discharge: HOME | End: 2023-11-15
Attending: INTERNAL MEDICINE | Admitting: INTERNAL MEDICINE
Payer: MEDICARE

## 2023-11-15 VITALS
WEIGHT: 164.4 LBS | HEIGHT: 67 IN | BODY MASS INDEX: 25.8 KG/M2 | SYSTOLIC BLOOD PRESSURE: 133 MMHG | RESPIRATION RATE: 16 BRPM | HEART RATE: 55 BPM | TEMPERATURE: 98.1 F | OXYGEN SATURATION: 96 % | DIASTOLIC BLOOD PRESSURE: 91 MMHG

## 2023-11-15 DIAGNOSIS — Z86.010 PERSONAL HISTORY OF COLONIC POLYPS: ICD-10-CM

## 2023-11-15 DIAGNOSIS — Z12.11 ENCOUNTER FOR SCREENING FOR MALIGNANT NEOPLASM OF COLON: ICD-10-CM

## 2023-11-15 DIAGNOSIS — R13.19 ESOPHAGEAL DYSPHAGIA: ICD-10-CM

## 2023-11-15 DIAGNOSIS — F41.9 ANXIETY: ICD-10-CM

## 2023-11-15 DIAGNOSIS — Z80.0 FAMILY HISTORY OF COLON CANCER: ICD-10-CM

## 2023-11-15 DIAGNOSIS — F51.01 PRIMARY INSOMNIA: ICD-10-CM

## 2023-11-15 DIAGNOSIS — K22.70 BARRETT'S ESOPHAGUS WITHOUT DYSPLASIA: ICD-10-CM

## 2023-11-15 PROCEDURE — 45385 COLONOSCOPY W/LESION REMOVAL: CPT | Performed by: INTERNAL MEDICINE

## 2023-11-15 PROCEDURE — 88305 TISSUE EXAM BY PATHOLOGIST: CPT | Performed by: INTERNAL MEDICINE

## 2023-11-15 PROCEDURE — 25010000002 PROPOFOL 1000 MG/100ML EMULSION: Performed by: ANESTHESIOLOGY

## 2023-11-15 PROCEDURE — 43239 EGD BIOPSY SINGLE/MULTIPLE: CPT | Performed by: INTERNAL MEDICINE

## 2023-11-15 PROCEDURE — 25810000003 LACTATED RINGERS PER 1000 ML: Performed by: NURSE PRACTITIONER

## 2023-11-15 PROCEDURE — 45381 COLONOSCOPY SUBMUCOUS NJX: CPT | Performed by: INTERNAL MEDICINE

## 2023-11-15 PROCEDURE — 45380 COLONOSCOPY AND BIOPSY: CPT | Performed by: INTERNAL MEDICINE

## 2023-11-15 PROCEDURE — 25010000002 PROPOFOL 10 MG/ML EMULSION: Performed by: ANESTHESIOLOGY

## 2023-11-15 PROCEDURE — 25010000002 LIDOCAINE 1 % SOLUTION: Performed by: ANESTHESIOLOGY

## 2023-11-15 RX ORDER — PROPOFOL 10 MG/ML
INJECTION, EMULSION INTRAVENOUS CONTINUOUS PRN
Status: DISCONTINUED | OUTPATIENT
Start: 2023-11-15 | End: 2023-11-15 | Stop reason: SURG

## 2023-11-15 RX ORDER — SODIUM CHLORIDE, SODIUM LACTATE, POTASSIUM CHLORIDE, CALCIUM CHLORIDE 600; 310; 30; 20 MG/100ML; MG/100ML; MG/100ML; MG/100ML
30 INJECTION, SOLUTION INTRAVENOUS CONTINUOUS PRN
Status: DISCONTINUED | OUTPATIENT
Start: 2023-11-15 | End: 2023-11-15 | Stop reason: HOSPADM

## 2023-11-15 RX ORDER — SODIUM CHLORIDE 0.9 % (FLUSH) 0.9 %
3 SYRINGE (ML) INJECTION EVERY 12 HOURS SCHEDULED
Status: DISCONTINUED | OUTPATIENT
Start: 2023-11-15 | End: 2023-11-15 | Stop reason: HOSPADM

## 2023-11-15 RX ORDER — SODIUM CHLORIDE 0.9 % (FLUSH) 0.9 %
10 SYRINGE (ML) INJECTION AS NEEDED
Status: DISCONTINUED | OUTPATIENT
Start: 2023-11-15 | End: 2023-11-15 | Stop reason: HOSPADM

## 2023-11-15 RX ORDER — ONDANSETRON 2 MG/ML
4 INJECTION INTRAMUSCULAR; INTRAVENOUS ONCE AS NEEDED
Status: DISCONTINUED | OUTPATIENT
Start: 2023-11-15 | End: 2023-11-15 | Stop reason: HOSPADM

## 2023-11-15 RX ORDER — PROPOFOL 10 MG/ML
VIAL (ML) INTRAVENOUS AS NEEDED
Status: DISCONTINUED | OUTPATIENT
Start: 2023-11-15 | End: 2023-11-15 | Stop reason: SURG

## 2023-11-15 RX ORDER — LIDOCAINE HYDROCHLORIDE 10 MG/ML
INJECTION, SOLUTION INFILTRATION; PERINEURAL AS NEEDED
Status: DISCONTINUED | OUTPATIENT
Start: 2023-11-15 | End: 2023-11-15 | Stop reason: SURG

## 2023-11-15 RX ADMIN — LIDOCAINE HYDROCHLORIDE 50 MG: 10 INJECTION, SOLUTION INFILTRATION; PERINEURAL at 13:14

## 2023-11-15 RX ADMIN — PROPOFOL 200 MCG/KG/MIN: 10 INJECTION, EMULSION INTRAVENOUS at 13:14

## 2023-11-15 RX ADMIN — PROPOFOL 100 MG: 10 INJECTION, EMULSION INTRAVENOUS at 13:14

## 2023-11-15 RX ADMIN — SODIUM CHLORIDE, POTASSIUM CHLORIDE, SODIUM LACTATE AND CALCIUM CHLORIDE 30 ML/HR: 600; 310; 30; 20 INJECTION, SOLUTION INTRAVENOUS at 12:18

## 2023-11-15 NOTE — ANESTHESIA POSTPROCEDURE EVALUATION
Patient: Jaimee Leonardo    Procedure Summary       Date: 11/15/23 Room / Location: [place] [facility] OR  / [place] [facility] OR    Anesthesia Start: 1306 Anesthesia Stop: 1406    Procedures:       ESOPHAGOGASTRODUODENOSCOPY      COLONOSCOPY TO CECUM Diagnosis:       Butler's esophagus without dysplasia      Personal history of colonic polyps      Family history of colon cancer      Esophageal dysphagia      Encounter for screening for malignant neoplasm of colon      (Butler's esophagus without dysplasia [K22.70])      (Personal history of colonic polyps [Z86.010])      (Family history of colon cancer [Z80.0])      (Esophageal dysphagia [R13.19])      (Encounter for screening for malignant neoplasm of colon [Z12.11])    Surgeons: Elías Higginbotham MD Provider: Razia Aguirre MD    Anesthesia Type: MAC ASA Status: 2            Anesthesia Type: MAC    Vitals  Vitals Value Taken Time   /91 11/15/23 1429   Temp 36.7 °C (98.1 °F) 11/15/23 1404   Pulse 55 11/15/23 1429   Resp 16 11/15/23 1429   SpO2 96 % 11/15/23 1429           Post Anesthesia Care and Evaluation      Comments: Patient discharged before being evaluated by an Anesthesiologist.  No apparent complications per the record.  THIS CASE WAS NOT MEDICALLY DIRECTED

## 2023-11-15 NOTE — ANESTHESIA PREPROCEDURE EVALUATION
Anesthesia Evaluation     Patient summary reviewed and Nursing notes reviewed                Airway   Mallampati: I  Dental - normal exam     Pulmonary - normal exam   (+) a smoker Former,  Cardiovascular - normal exam    ECG reviewed    (+) hypertension, valvular problems/murmurs murmur, hyperlipidemia    ROS comment: Reviewed stress test:      Myocardial perfusion imaging indicates a normal myocardial perfusion study with no evidence of ischemia.  ·  Left ventricular ejection fraction is hyperdynamic (Calculated EF > 70%).  ·  Impressions are consistent with a low risk study.  ·  Breast attenuation and diaphragmatic attenuation artifacts are present.  ·  Findings consistent with an indeterminate ECG stress test.         Neuro/Psych  (+) tremors, psychiatric history Anxiety and Depression  GI/Hepatic/Renal/Endo    (+) GERD  (-) no renal disease, diabetes, no thyroid disorder    Musculoskeletal     Abdominal    Substance History      OB/GYN          Other   arthritis,                 Anesthesia Plan    ASA 2     MAC       Anesthetic plan, risks, benefits, and alternatives have been provided, discussed and informed consent has been obtained with: patient.    CODE STATUS:

## 2023-11-15 NOTE — H&P
No chief complaint on file.      HPI  Barretts  H/o polyps  Dysphagia  Screening  [facility]         Problem List:    Patient Active Problem List   Diagnosis    JANELLE (generalized anxiety disorder)    Butler's esophagus    Benign essential hypertension    Generalized osteoarthritis    Migraine with aura and without status migrainosus, not intractable    Insomnia    Irritable bowel syndrome    Failed back syndrome    Nicotine dependence    Hx of colonic polyp    S/P PICC central line placement    MDD (major depressive disorder), recurrent episode, moderate    Family history of colon cancer    Esophageal dysphagia    Encounter for screening for malignant neoplasm of colon    High risk medication use       Medical History:    Past Medical History:   Diagnosis Date    Allergic     Anxiety     Arthritis     Butler esophagus     Bradycardia     Colon polyp     Depression     Eye inflammation     GERD (gastroesophageal reflux disease)     Headache     Heart murmur     Hyperlipidemia     Hypertension     Irritable bowel syndrome     Low back pain     Palpitation     Tremor     Visual impairment         Social History:    Social History     Socioeconomic History    Marital status:    Tobacco Use    Smoking status: Former     Packs/day: 0.50     Years: 30.00     Additional pack years: 0.00     Total pack years: 15.00     Types: Cigarettes     Quit date: 9/15/2019     Years since quittin.1     Passive exposure: Past    Smokeless tobacco: Never   Vaping Use    Vaping Use: Never used   Substance and Sexual Activity    Alcohol use: Yes     Comment: Maybe once a month    Drug use: No    Sexual activity: Yes     Partners: Male     Birth control/protection: None       Family History:   Family History   Problem Relation Age of Onset    Colon polyps Mother     Stroke Mother     Anxiety disorder Mother     Arthritis Mother     Heart disease Mother         Atrial fibrillation    Hyperlipidemia Mother     Vision loss Mother          Glaucoma    Bone cancer Father     Alzheimer's disease Father     Arthritis Father     Cancer Father         Multiple myeloma    Colon cancer Maternal Grandfather     Cancer Maternal Grandfather         Colon    Hyperlipidemia Maternal Grandfather     Alcohol abuse Paternal Grandfather     Alcohol abuse Son     Anxiety disorder Son     Birth defects Son     Depression Son     Irritable bowel syndrome Son     Anxiety disorder Son     Crohn's disease Neg Hx     Ulcerative colitis Neg Hx        Surgical History:   Past Surgical History:   Procedure Laterality Date    BACK SURGERY      CERVICAL DISC SURGERY  11/06/2017    C5-6 revision and C6-7 ACDF    COLONOSCOPY      ENDOMETRIAL ABLATION      JOINT REPLACEMENT      SPINE SURGERY      TRIGGER FINGER RELEASE Bilateral     TUBAL ABDOMINAL LIGATION      half    UPPER GASTROINTESTINAL ENDOSCOPY         No current facility-administered medications for this encounter.    Current Outpatient Medications:     atorvastatin (LIPITOR) 20 MG tablet, Take 1 tablet by mouth Daily., Disp: 90 tablet, Rfl: 3    baclofen (LIORESAL) 10 MG tablet, , Disp: , Rfl:     bisacodyl (DULCOLAX) 5 MG EC tablet, Take 1 tablet by mouth Daily As Needed for Constipation., Disp: , Rfl:     clonazePAM (KlonoPIN) 0.5 MG tablet, TAKE 1/2 TABLET BY MOUTH EVERY MORNING AND TAKE ONE TABLET BY MOUTH EVERY EVENING, Disp: 45 tablet, Rfl: 0    diphenhydrAMINE (BENADRYL) 25 mg capsule, Take 1 capsule by mouth Every 6 (Six) Hours As Needed for Itching., Disp: , Rfl:     DULoxetine (CYMBALTA) 60 MG capsule, Take 1 capsule by mouth Daily., Disp: 30 capsule, Rfl: 2    EPINEPHrine (EpiPen 2-Uri) 0.3 MG/0.3ML solution auto-injector injection, 0.3 mL., Disp: , Rfl:     gabapentin (NEURONTIN) 600 MG tablet, 1 tablet 3 (Three) Times a Day., Disp: , Rfl:     irbesartan (AVAPRO) 150 MG tablet, TAKE 1 TABLET BY MOUTH DAILY (Patient taking differently: Take 0.5 tablets by mouth Daily.), Disp: 90 tablet, Rfl: 1    MULTIPLE  VITAMINS ESSENTIAL PO, Take by mouth., Disp: , Rfl:     omeprazole (priLOSEC) 40 MG capsule, TAKE 1 CAPSULE BY MOUTH EVERY DAY, Disp: 90 capsule, Rfl: 1    oxyCODONE (ROXICODONE) 5 MG immediate release tablet, 1 tablet 4 (Four) Times a Day., Disp: , Rfl:     rizatriptan (MAXALT) 10 MG tablet, TAKE 1 TABLET BY MOUTH AT ONSET OF HEADACHE; MAY REPEAT 1 TABLET IN 2 HOURS IF NEEDED, Disp: 12 tablet, Rfl: 1    traZODone (DESYREL) 50 MG tablet, TAKE ONE TO TWO TABLETS BY MOUTH EVERY NIGHT AT BEDTIME AS NEEDED FOR SLEEP, Disp: 60 tablet, Rfl: 2    Allergies:   Allergies   Allergen Reactions    Bee Venom Anaphylaxis    Morphine And Related     Rofecoxib         The following portions of the patient's history were reviewed by me and updated as appropriate: review of systems, allergies, current medications, past family history, past medical history, past social history, past surgical history and problem list.    There were no vitals filed for this visit.    PHYSICAL EXAM:    CONSTITUTIONAL:  today's vital signs reviewed by me  GASTROINTESTINAL: abdomen is soft nontender nondistended with normal active bowel sounds, no masses are appreciated    Assessment/ Plan  Barretts  H/o polyps  Dysphagia  Screening  [facility]    Egd and colonoscopy    Risks and benefits as well as alternatives to endoscopic evaluation were explained to the patient and they voiced understanding and wish to proceed.  These risks include but are not limited to the risk of bleeding, perforation, adverse reaction to sedation, and missed lesions.  The patient was given the opportunity to ask questions prior to the endoscopic procedure.

## 2023-11-16 LAB
LAB AP CASE REPORT: NORMAL
LAB AP CLINICAL INFORMATION: NORMAL
PATH REPORT.FINAL DX SPEC: NORMAL
PATH REPORT.GROSS SPEC: NORMAL

## 2023-11-16 RX ORDER — CLONAZEPAM 0.5 MG/1
TABLET ORAL
Qty: 45 TABLET | Refills: 0 | Status: SHIPPED | OUTPATIENT
Start: 2023-11-16

## 2023-11-22 ENCOUNTER — TELEPHONE (OUTPATIENT)
Dept: CARDIOLOGY | Facility: CLINIC | Age: 58
End: 2023-11-22
Payer: MEDICARE

## 2023-11-22 NOTE — TELEPHONE ENCOUNTER
Called and left VM. Will continue to try to reach patient.     Kymberly Huggins RN  Triage [facility] – [place]

## 2023-11-22 NOTE — TELEPHONE ENCOUNTER
----- Message from Gokul Kemp II, MA sent at 11/22/2023 12:47 PM EST -----    ----- Message -----  From: Alod Mary MD  Sent: 11/22/2023   9:40 AM EST  To: Chandrakant Hardy Rep; Gokul Kemp II, MA    Please notify Jaimee that Holter is normal.  Let me know if she has any questions.  Follow-up in office in 6 months.

## 2023-11-27 NOTE — TELEPHONE ENCOUNTER
Called and left VM. Will continue to try to reach patient.     Kymberly Huggins RN  [place] [facility] [place]

## 2023-11-29 ENCOUNTER — OFFICE VISIT (OUTPATIENT)
Dept: GASTROENTEROLOGY | Facility: CLINIC | Age: 58
End: 2023-11-29
Payer: MEDICARE

## 2023-11-29 VITALS
BODY MASS INDEX: 24.96 KG/M2 | DIASTOLIC BLOOD PRESSURE: 80 MMHG | OXYGEN SATURATION: 99 % | HEART RATE: 60 BPM | HEIGHT: 67 IN | SYSTOLIC BLOOD PRESSURE: 120 MMHG | TEMPERATURE: 97 F | WEIGHT: 159 LBS

## 2023-11-29 DIAGNOSIS — Z87.19 HISTORY OF BARRETT'S ESOPHAGUS: ICD-10-CM

## 2023-11-29 DIAGNOSIS — Z80.0 FAMILY HISTORY OF COLON CANCER: ICD-10-CM

## 2023-11-29 DIAGNOSIS — K21.9 GASTROESOPHAGEAL REFLUX DISEASE WITHOUT ESOPHAGITIS: Primary | ICD-10-CM

## 2023-11-29 DIAGNOSIS — Z86.010 HX OF COLONIC POLYP: ICD-10-CM

## 2023-11-29 PROCEDURE — 3079F DIAST BP 80-89 MM HG: CPT | Performed by: NURSE PRACTITIONER

## 2023-11-29 PROCEDURE — 1159F MED LIST DOCD IN RCRD: CPT | Performed by: NURSE PRACTITIONER

## 2023-11-29 PROCEDURE — 1160F RVW MEDS BY RX/DR IN RCRD: CPT | Performed by: NURSE PRACTITIONER

## 2023-11-29 PROCEDURE — 3074F SYST BP LT 130 MM HG: CPT | Performed by: NURSE PRACTITIONER

## 2023-11-29 PROCEDURE — 99213 OFFICE O/P EST LOW 20 MIN: CPT | Performed by: NURSE PRACTITIONER

## 2023-11-29 NOTE — PROGRESS NOTES
"Chief Complaint   Patient presents with    Follow-up     Review recent EGD and colonoscopy           History of Present Illness  58-year-old female presents today for follow-up after EGD and colonoscopy 11/15/2023.  Initial visit September 18, 2023.  Patient has a history of Butler's esophagus and colon polyps.  Also family history of colon cancer.  She has chronic constipation, bowel movements are regular with Dulcolax.    She has been off of omeprazole for the past couple of months and restarted it a couple of weeks prior to EGD.   She has noticed epigastric discontinue    Bowel movements are regular with generic over-the-counter Dulcolax once per day.     She has been recently diagnosed with rheumatoid arthritis, recommended to start methotrexate, she is considering starting this medication, she is followed by rheumatologist Dr. Ayala.     Result Review :       Tissue Pathology Exam (11/15/2023 13:17)  COLONOSCOPY (11/15/2023 13:00)  UPPER GI ENDOSCOPY (11/15/2023 13:01)    Vital Signs:   /80   Pulse 60   Temp 97 °F (36.1 °C)   Ht 170.2 cm (67.01\")   Wt 72.1 kg (159 lb)   SpO2 99%   BMI 24.90 kg/m²     Body mass index is 24.9 kg/m².     Physical Exam  Vitals reviewed.   Constitutional:       General: She is not in acute distress.     Appearance: Normal appearance. She is not ill-appearing or toxic-appearing.   Eyes:      General: No scleral icterus.  Pulmonary:      Effort: Pulmonary effort is normal. No respiratory distress.   Skin:     Coloration: Skin is not jaundiced.   Neurological:      Mental Status: She is alert and oriented to person, place, and time.   Psychiatric:         Mood and Affect: Mood normal.         Behavior: Behavior normal.         Thought Content: Thought content normal.         Judgment: Judgment normal.       Assessment and Plan    Diagnoses and all orders for this visit:    1. Gastroesophageal reflux disease without esophagitis (Primary)    2. History of Butler's " esophagus    3. Hx of colonic polyp    4. Family history of colon cancer       Reviewed recent EGD and colonoscopy.  History of Butler's esophagus, recent biopsies were negative, Dr. Higginbotham recommends repeat EGD in 3 years for monitoring of history of Butler's esophagus.    Recent colonoscopy with numerous colon polyps, recommend colonoscopy in 1 to 2 years for colon cancer surveillance and continued monitoring    Given adequate bowel prep, patient will need 2-day bowel prep for colonoscopy in 2 years with 2 bottles of magnesium citrate 2 days before procedure.    Continue omeprazole daily    For GERD, follow antireflux precautions.  Recommend avoiding eating within 3 to 4 hours of bedtime.  Avoid foods that can trigger symptoms which may include citrus fruits, spicy foods, tomatoes and red sauces, chocolate, coffee/tea, caffeinated or carbonated beverages, alcohol.    Avoid foods that worsen GI symptoms.    Follow-up with our office as needed        Patient Instructions   Recent colonoscopy with numerous colon polyps, recommend colonoscopy in 1 to 2 years for colon cancer surveillance and continued monitoring    Given adequate bowel prep, patient will need 2-day bowel prep for colonoscopy in 2 years with 2 bottles of magnesium citrate 2 days before procedure.    Continue omeprazole daily    Dr. Higginbotham recommends repeat EGD in 3 years for monitoring of history of Butler's esophagus.    For GERD, follow antireflux precautions.  Recommend avoiding eating within 3 to 4 hours of bedtime.  Avoid foods that can trigger symptoms which may include citrus fruits, spicy foods, tomatoes and red sauces, chocolate, coffee/tea, caffeinated or carbonated beverages, alcohol.    Avoid foods that worsen GI symptoms.    Follow-up with our office as needed        EMR Dragon/Transcription Disclaimer:  This document has been Dictated utilizing Dragon dictation.

## 2023-12-01 NOTE — PATIENT INSTRUCTIONS
Recent colonoscopy with numerous colon polyps, recommend colonoscopy in 1 to 2 years for colon cancer surveillance and continued monitoring    Given adequate bowel prep, patient will need 2-day bowel prep for colonoscopy in 2 years with 2 bottles of magnesium citrate 2 days before procedure.    Continue omeprazole daily    Dr. Higginbotham recommends repeat EGD in 3 years for monitoring of history of Butler's esophagus.    For GERD, follow antireflux precautions.  Recommend avoiding eating within 3 to 4 hours of bedtime.  Avoid foods that can trigger symptoms which may include citrus fruits, spicy foods, tomatoes and red sauces, chocolate, coffee/tea, caffeinated or carbonated beverages, alcohol.    Avoid foods that worsen GI symptoms.    Follow-up with our office as needed

## 2023-12-05 DIAGNOSIS — G47.00 INSOMNIA, UNSPECIFIED TYPE: ICD-10-CM

## 2023-12-05 RX ORDER — TRAZODONE HYDROCHLORIDE 50 MG/1
TABLET ORAL
Qty: 60 TABLET | Refills: 2 | Status: SHIPPED | OUTPATIENT
Start: 2023-12-05

## 2023-12-18 NOTE — PROGRESS NOTES
Patient assessed today via MyChart through EPIC pt is at home in a secure environment and verbalizes privacy during interview. LINDA Ovalle is at home in a secure environment using a secure laptop.The patient's condition being diagnosed/treated is appropriate for telemedicine.The provider identified himself as well as his credentials.The patient, and/or patient's guardian, consent to be seen remotely, and when consent is given they understand that the consent allows for patient identifiable information to be sent to a third party as needed. They may refuse to be seen remotely at any time. The electronic data is encrypted and password protected, and the patient and/or guardian has been advised of the potential risks to privacy not withstanding such measures.    [facility] PROGRESS NOTE  [facility]  [address] [place] [address]    NAME: Jaimee Leonardo     : 1965   MRN: [id number]   PCP: Saumya Hurd MD     DATE: 2023    ALLERGY:Bee venom, Morphine and related, and Rofecoxib     MEDICATIONS:  Current Outpatient Medications   Medication Instructions    atorvastatin (LIPITOR) 20 mg, Oral, Daily    baclofen (LIORESAL) 10 MG tablet No dose, route, or frequency recorded.    bisacodyl (DULCOLAX) 5 mg, Oral, Daily PRN    clonazePAM (KlonoPIN) 0.5 MG tablet TAKE 1/2 TABLET BY MOUTH EVERY MORNING AND TAKE ONE TABLET BY MOUTH EVERY EVENING    diphenhydrAMINE (BENADRYL) 25 mg, Oral, Every 6 Hours PRN    DULoxetine (CYMBALTA) 60 mg, Oral, Daily    EPINEPHrine (EPIPEN 2-AVE) 0.3 mg    gabapentin (NEURONTIN) 600 mg, 3 Times Daily    irbesartan (AVAPRO) 150 MG tablet TAKE 1 TABLET BY MOUTH DAILY    MULTIPLE VITAMINS ESSENTIAL PO Oral    omeprazole (priLOSEC) 40 MG capsule TAKE 1 CAPSULE BY MOUTH EVERY DAY    oxyCODONE (ROXICODONE) 5 mg, 4 Times Daily    rizatriptan (MAXALT) 10 MG tablet TAKE 1 TABLET BY MOUTH AT ONSET OF HEADACHE; MAY REPEAT 1 TABLET IN 2 HOURS IF NEEDED    traZODone  (DESYREL) 50 MG tablet TAKE ONE TO TWO TABLETS BY MOUTH EVERY NIGHT AT BEDTIME AS NEEDED FOR SLEEP     VITALS: There were no vitals taken for this visit. No LMP recorded. Patient is postmenopausal.     Subjective     Chief Complaint   Patient presents with    Anxiety    Depression    Insomnia    Follow-up      HPI:  58 y.o. female patient seen for follow up.  Patient last seen roughly 3 months ago, no medication changes at that time, since then patient reports she had a EGD and colonoscopy for Butler's esophagus and high risk for colon cancer due to family history, patient reports they found 5 polyps which were all fine, gastritis was noted which appears to be mild, patient using Prilosec, patient suffers from chronic pain is on opiate pain medication is seeing a rheumatologist they are considering adding methotrexate which is a immunosuppressant/chemotherapy type drug that she is anxious about, reports recently traveling to [place] [employer] for a [employer] party at a friend's house, is looking forward to meeting son in [place] for [employer], poor sleep reported, it waxes and wanes, reports the past 3 nights it has been exceptionally bad, at 1 point she took 3 trazodone's which I am fine with for the time being.  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    Patient Active Problem List   Diagnosis    JANELLE (generalized anxiety disorder)    Butler's esophagus    Benign essential hypertension    Generalized osteoarthritis    Migraine with aura and without status migrainosus, not intractable    Insomnia    Irritable bowel syndrome    Failed back syndrome    Nicotine dependence    Hx of colonic polyp    S/P PICC central line placement    MDD (major depressive disorder), recurrent episode, moderate    Family history of colon cancer    Esophageal dysphagia    Encounter for  screening for malignant neoplasm of colon    High risk medication use     Social Status:  Ethnic Group: Not  Or   Race: White Or   Marital Status:    Employment status: Disabled        SUBSTANCE/SEXUAL HISTORY:  Social History     Socioeconomic History    Marital status:    Tobacco Use    Smoking status: Former     Packs/day: 0.50     Years: 30.00     Additional pack years: 0.00     Total pack years: 15.00     Types: Cigarettes     Quit date: 9/15/2019     Years since quittin.2     Passive exposure: Past    Smokeless tobacco: Never   Vaping Use    Vaping Use: Never used   Substance and Sexual Activity    Alcohol use: Yes     Comment: Maybe once a month    Drug use: No    Sexual activity: Yes     Partners: Male     Birth control/protection: None     FAMILY HISTORY:  family history includes Alcohol abuse in her paternal grandfather and son; Alzheimer's disease in her father; Anxiety disorder in her mother, son, and son; Arthritis in her father and mother; Birth defects in her son; Bone cancer in her father; Cancer in her father and maternal grandfather; Colon cancer in her maternal grandfather; Colon polyps in her mother; Depression in her son; Heart disease in her mother; Hyperlipidemia in her maternal grandfather and mother; Irritable bowel syndrome in her son; Stroke in her mother; Vision loss in her mother.     PAST MEDICAL HISTORY   has a past medical history of Allergic, Anxiety, Arthritis, Butler esophagus, Bradycardia, Colon polyp, Depression, Eye inflammation, GERD (gastroesophageal reflux disease), Headache, Heart murmur, Hyperlipidemia, Hypertension, Irritable bowel syndrome, Low back pain, Palpitation, Tremor, and Visual impairment.    She has no past medical history of Hard to intubate, Malignant hyperthermia due to anesthesia, or PONV (postoperative nausea and vomiting).     Review of Systems   Constitutional: Negative.    Respiratory:  Negative for chest  tightness and shortness of breath.    Cardiovascular:  Negative for chest pain.   Gastrointestinal:  Positive for constipation and nausea. Negative for vomiting.   Musculoskeletal:  Positive for back pain.   Neurological:  Negative for dizziness and light-headedness.   Psychiatric/Behavioral:  Positive for sleep disturbance and stress. Negative for suicidal ideas.      Objective   Physical Exam  Constitutional:       Appearance: Normal appearance.   HENT:      Head: Normocephalic.   Pulmonary:      Effort: Pulmonary effort is normal.   Skin:     General: Skin is dry.   Neurological:      Mental Status: He/She/They are alert and oriented to person, place, and time.     PHQ-9 Depression Screening  Little interest or pleasure in doing things? (P) 1-->several days   Feeling down, depressed, or hopeless? (P) 1-->several days   Trouble falling or staying asleep, or sleeping too much? (P) 2-->more than half the days   Feeling tired or having little energy?     Poor appetite or overeating? (P) 0-->not at all   Feeling bad about yourself/you are a failure/have let yourself or your family down? (P) 0-->not at all   Trouble concentrating on things? (P) 1-->several days   Psychomotor agitation/retardation (P) 0-->not at all   Thoughts about death/dying/suicide (P) 0-->not at all   PHQ-9 Total Score (P) 7   How difficult have these problems for you? (P) not difficult at all     GAD7 Documentation:  Feeling nervous, anxious or on edge (P) 1   Not being able to stop or control worrying (P) 1   Worrying too much about different things (P) 0   Trouble relaxing (P) 0   Being so restless that it is hard to sit still (P) 0   Becoming easily annoyed or irritable (P) 1   Feeling Afraid as if something awful might happen (P) 0   JANELLE Total Score (P) 3   How difficult have these problems made it for you?       MENTAL STATUS EXAM   General Appearance:  Cleanly groomed and dressed  Eye Contact:  Good eye contact  Attitude:   Cooperative  Speech:  Normal rate, tone, volume  Mood and affect:  Normal, pleasant  Thought Process:  Goal-directed  Associations/ Thought Content:  No delusions  Hallucinations:  None  Suicidal Ideations:  Not present  Homicidal Ideation:  Not present  Sensorium:  Alert  Orientation:  Person, place, time and situation  Attention Span/ Concentration:  Good  Fund of Knowledge:  Appropriate for age and educational level  Intellectual Functioning:  Above average  Insight:  Good  Judgement:  Good  Reliability:  Good  Impulse Control:  Good     LABS:    CBC          5/26/2023    10:09 8/25/2023    13:15 9/6/2023    11:22   CBC   WBC 4.19  3.13  4.44    RBC 4.26  4.21  4.34    Hemoglobin 12.6  12.3  12.4    Hematocrit 38.4  37.4  38.1    MCV 90.1  88.8  87.8    MCH 29.6  29.2  28.6    MCHC 32.8  32.9  32.5    RDW 12.6  12.5  12.2    Platelets 262  235  244       CMP          5/26/2023    10:09   CMP   Glucose 110    BUN 11    Creatinine 0.59    Sodium 140    Potassium 5.4    Chloride 103    Calcium 10.4    Total Protein 6.7    Albumin 4.7    Globulin 2.0    Total Bilirubin 0.3    Alkaline Phosphatase 67    AST (SGOT) 22    ALT (SGPT) 17    BUN/Creatinine Ratio 18.6       Assessment    ASSESSMENT/TREATMENT PLAN/INSTRUCTIONS/EDUCATION    (G47.00) Insomnia, unspecified type    (F41.1) JANELLE (generalized anxiety disorder) - Plan: DULoxetine (CYMBALTA) 60 MG capsule    (F33.1) MDD (major depressive disorder), recurrent episode, moderate - Plan: DULoxetine (CYMBALTA) 60 MG capsule    (Z79.899) High risk medication use (benzo for anxiety)    (M15.9) Generalized osteoarthritis - Plan: DULoxetine (CYMBALTA) 60 MG capsule    (G89.29) Other chronic pain - Plan: DULoxetine (CYMBALTA) 60 MG capsule     -MDD/JANELLE: Improved/stable with treatment.  Agreed to continue Cymbalta, trazodone, Klonopin as previously ordered.  -Insomnia: Worse on/off tied to situational stress + chronic pain +  medical issues.   -High medication use:  Patient is a retired nurse, has been educated on risk of combining benzodiazepines and opiates together.  -Positive coping skills discussed today including light/moderate exercise, time on the farm, time with horses, holiday activities  -Agreed to follow-up in 6 months or sooner if needed    Return in 6 months (on 6/19/2024) for Video visit.    PSYCHOTHERAPY:  In/Start Time: 1100.  Out/Stop Time: 1116.  16 minutes of face to face direct patient care with patient was spent for supportive psychotherapy including strengthening self awareness and insights, strengthening coping skills, counseling the patient regarding diagnoses, and utilizing cognitive behavioral therapy to assist the patient in recognizing more appropriate coping mechanisms which are proven effective in reducing the severity of frequency of symptoms.  This APRN assisted the patient in processing the patient's diagnoses, and also acknowledged and normalized the patient's thoughts, feelings, and concerns This APRN allowed the patient to freely discuss issues without interruption or judgment.      MEDICATION ISSUES:  MONET: Reviewed during F/U appt's via interface.    Medications Discontinued During This Encounter   Medication Reason    DULoxetine (CYMBALTA) 60 MG capsule Reorder       New Medications Ordered This Visit   Medications    DULoxetine (CYMBALTA) 60 MG capsule     Sig: Take 1 capsule by mouth Daily.     Dispense:  90 capsule     Refill:  1       No orders of the defined types were placed in this encounter.    Barriers: medically complex, high risk medication and stress and chronic pain  Strengths:  motivated , positive attitude and knowledgeable about condition/medications/disease course     -Progress toward goal:  At goal  -Functional Status: Moderate impairment   -Prognosis: Fair with Ongoing Treatment      Part of this note may be an electronic transcription/translation of spoken language to printed text using the Dragon Dictation System.  Some of the data in this electronic note has been brought forward from a previous encounter, any necessary changes have been made, it has been reviewed by this APRN, and it is accurate.    This document has been electronically signed by NAOMI Bocanegra December 19, 2023 11:24 EST

## 2023-12-19 ENCOUNTER — TELEMEDICINE (OUTPATIENT)
Dept: BEHAVIORAL HEALTH | Facility: CLINIC | Age: 58
End: 2023-12-19
Payer: MEDICARE

## 2023-12-19 DIAGNOSIS — F41.9 ANXIETY: ICD-10-CM

## 2023-12-19 DIAGNOSIS — G89.29 OTHER CHRONIC PAIN: ICD-10-CM

## 2023-12-19 DIAGNOSIS — F51.01 PRIMARY INSOMNIA: ICD-10-CM

## 2023-12-19 DIAGNOSIS — G47.00 INSOMNIA, UNSPECIFIED TYPE: Primary | ICD-10-CM

## 2023-12-19 DIAGNOSIS — F33.1 MDD (MAJOR DEPRESSIVE DISORDER), RECURRENT EPISODE, MODERATE: ICD-10-CM

## 2023-12-19 DIAGNOSIS — F41.1 GAD (GENERALIZED ANXIETY DISORDER): ICD-10-CM

## 2023-12-19 DIAGNOSIS — Z79.899 HIGH RISK MEDICATION USE: ICD-10-CM

## 2023-12-19 DIAGNOSIS — M15.9 GENERALIZED OSTEOARTHRITIS: ICD-10-CM

## 2023-12-19 RX ORDER — DULOXETIN HYDROCHLORIDE 60 MG/1
60 CAPSULE, DELAYED RELEASE ORAL DAILY
Qty: 90 CAPSULE | Refills: 1 | Status: SHIPPED | OUTPATIENT
Start: 2023-12-19

## 2023-12-19 NOTE — PATIENT INSTRUCTIONS
GENERAL NEW PATIENT INSTRUCTIONS:  -The best way to get a hold of Vasquez is to call the office at [phone] and ask to leave a message with his medical assistant.  Patient's are not able to message their mental health provider directly via [employer], please give my office up to 48 hours to respond to a patient call/question/refill request.  -Please call 911 or go to the nearest ER if you begin to have thoughts of harming yourself or other people.  -Refill requests will be made during normal office hours, Monday-Friday 8:00-5:30.  Vasquez is out of the office on Wednesdays and weekends.  Follow-up appointments must be maintained in order for prescribing to continue.    -Tuesdays and Thursdays are Vasquez's in-office days, Mondays and Fridays are his telehealth days.  The decision to be seen virtually or in person is up to the discretion of the provider, not all behavioral health problems are appropriate for telehealth.    NO SHOW POLICY:  We understand unexpected circumstances arise; however, anytime you miss your appointment we are unable to provide you appropriate care.  In addition, each appointment missed could have been used to provide care for others.  We ask that you call at least 24 hours in advance to cancel or reschedule an appointment. We would like to take this opportunity to remind you of our policy stating patients who miss THREE or more appointments without cancelling or rescheduling 24 hours in advance of the appointment may be subject to cancellation of any further visits with our clinic and recommendation to seek in-person services/visits. Please call [phone] to reschedule your appointment. If there are reasons that make it difficult for you to keep the appointments, please call and let us know how we can help. Please understand that medication prescribing will not continue without seeing your provider.       [facility] [address] [place] [address]  P: [phone]  F:  [phone]

## 2023-12-20 RX ORDER — CLONAZEPAM 0.5 MG/1
TABLET ORAL
Qty: 45 TABLET | Refills: 0 | Status: SHIPPED | OUTPATIENT
Start: 2023-12-20

## 2024-01-18 DIAGNOSIS — F41.9 ANXIETY: ICD-10-CM

## 2024-01-18 DIAGNOSIS — F33.1 MDD (MAJOR DEPRESSIVE DISORDER), RECURRENT EPISODE, MODERATE: ICD-10-CM

## 2024-01-18 DIAGNOSIS — F51.01 PRIMARY INSOMNIA: ICD-10-CM

## 2024-01-18 DIAGNOSIS — Z79.899 HIGH RISK MEDICATION USE: Primary | ICD-10-CM

## 2024-01-18 DIAGNOSIS — F41.1 GAD (GENERALIZED ANXIETY DISORDER): ICD-10-CM

## 2024-01-18 RX ORDER — DULOXETIN HYDROCHLORIDE 30 MG/1
CAPSULE, DELAYED RELEASE ORAL
Qty: 42 CAPSULE | Refills: 0 | OUTPATIENT
Start: 2024-01-18

## 2024-01-19 RX ORDER — NALOXONE HYDROCHLORIDE 4 MG/.1ML
1 SPRAY NASAL AS NEEDED
Qty: 2 EACH | Refills: 1 | Status: SHIPPED | OUTPATIENT
Start: 2024-01-19

## 2024-01-19 RX ORDER — CLONAZEPAM 0.5 MG/1
TABLET ORAL
Qty: 45 TABLET | Refills: 0 | Status: SHIPPED | OUTPATIENT
Start: 2024-01-19

## 2024-01-19 RX ORDER — OXYCODONE HYDROCHLORIDE 10 MG/1
TABLET ORAL
COMMUNITY
Start: 2024-01-16

## 2024-02-12 ENCOUNTER — OFFICE VISIT (OUTPATIENT)
Dept: FAMILY MEDICINE CLINIC | Facility: CLINIC | Age: 59
End: 2024-02-12
Payer: MEDICARE

## 2024-02-12 VITALS
HEART RATE: 64 BPM | SYSTOLIC BLOOD PRESSURE: 118 MMHG | OXYGEN SATURATION: 97 % | DIASTOLIC BLOOD PRESSURE: 70 MMHG | BODY MASS INDEX: 25.9 KG/M2 | WEIGHT: 165 LBS | HEIGHT: 67 IN | RESPIRATION RATE: 18 BRPM

## 2024-02-12 DIAGNOSIS — H69.93 DYSFUNCTION OF BOTH EUSTACHIAN TUBES: ICD-10-CM

## 2024-02-12 DIAGNOSIS — I10 BENIGN ESSENTIAL HYPERTENSION: Primary | ICD-10-CM

## 2024-02-12 PROCEDURE — 1159F MED LIST DOCD IN RCRD: CPT | Performed by: FAMILY MEDICINE

## 2024-02-12 PROCEDURE — 3078F DIAST BP <80 MM HG: CPT | Performed by: FAMILY MEDICINE

## 2024-02-12 PROCEDURE — 99213 OFFICE O/P EST LOW 20 MIN: CPT | Performed by: FAMILY MEDICINE

## 2024-02-12 PROCEDURE — 3074F SYST BP LT 130 MM HG: CPT | Performed by: FAMILY MEDICINE

## 2024-02-12 PROCEDURE — 1160F RVW MEDS BY RX/DR IN RCRD: CPT | Performed by: FAMILY MEDICINE

## 2024-02-12 RX ORDER — IRBESARTAN 75 MG/1
75 TABLET ORAL DAILY
Qty: 90 TABLET | Refills: 1 | Status: SHIPPED | OUTPATIENT
Start: 2024-02-12

## 2024-02-24 NOTE — TELEPHONE ENCOUNTER
Patient: Guillermina Sotelo Date: 2024   : 1953 Attending: Kermit Cisneros MD   70 year old female                 Subjective:  I feel fine today, to procedure was never done on my heart valve.     Reviewed: Allergies, Medical History, Surgical History, Social History, Family History and Medications    Vital Last Value 24 Hour Range   Temperature 97.7 °F (36.5 °C) (24 06) Temp  Min: 97.7 °F (36.5 °C)  Max: 98.1 °F (36.7 °C)   Pulse 79 (24) Pulse  Min: 68  Max: 96   Respiratory 16 (24) Resp  Min: 16  Max: 18   Non-Invasive  Blood Pressure 134/73 (24 0831) BP  Min: 108/55  Max: 134/73   Pulse Oximetry 94 % (24) SpO2  Min: 94 %  Max: 98 %     Vital Today Admit   Weight 111.6 kg (246 lb) (24 0602) Weight: 110 kg (242 lb 8.1 oz) (24 1448)   Height N/A Height: 5' 7\" (170.2 cm) (24 1500)   BMI N/A BMI (Calculated): 37.98 (24 1500)     Weight over the past 48 Hours:  Patient Vitals for the past 48 hrs:   Weight   24 1448 110 kg (242 lb 8.1 oz)   24 1500 110 kg (242 lb 8.1 oz)   24 0602 111.6 kg (246 lb)          Intake/Output:    Last Stool Occurrence: 1 (24)    No intake/output data recorded.    I/O last 3 completed shifts:  In: 660 [P.O.:660]  Out: -       Intake/Output Summary (Last 24 hours) at 2024 1355  Last data filed at 2024  Gross per 24 hour   Intake 660 ml   Output --   Net 660 ml         Central Line: No    [place]: No    Physical Exam:   General:  Alert, cooperative, in no distress, well-developed and nourished.  Head: Normocephalic, without obvious abnormality, atraumatic.  Eyes: conjunctivae/corneas clear, EOM's intact, sclera white       Throat: Lips, mucosa moist.  Neck: Supple, symmetrical, trachea midline, no JVD  Lungs:  Clear to auscultation bilaterally, respirations unlabored, no wheezes or rhonchi.  Heart: Regular rate and rhythm, S1 and S2   Abdomen:  Soft, nontender, bowel  Last script was incorrect so i've made the change to what it was before.    Last seen 9/9/2019    Last requested 3/12/2020    alfredo urias   sounds active all four quadrants, no hepatosplenomegaly.  Extremities:  Bilateral lower extremity edema  Neurologic:CN II-XII intact. Normal strength    Laboratory Results:    HEMATOLOGY AND CHEMISTRY:  Recent Labs   Lab 02/24/24  0648 02/23/24  1035 02/23/24  0523 02/22/24  1633 02/22/24  0550   WBC 9.5  --  5.0 5.9 4.8   HCT 35.9*  --  37.2 34.8* 35.2*   HGB 11.0*  --  11.9* 11.3* 11.4*   MCV 98.1  --  97.6 94.6 96.2   *  --  126* 117* 124*   INR 1.3 1.3  --   --  1.3   SODIUM 137  --  137  --  139   POTASSIUM 4.6  --  4.9 4.2 3.9   GLUCOSE 209*  --  260*  --  151*   BUN 73*  --  66*  --  66*   CREATININE 2.04*  --  1.90*  --  2.08*   AST  --   --   --   --  24   GPT  --   --   --   --  17   ALKPT  --   --   --   --  86   BILIRUBIN  --   --   --   --  0.6   ALBUMIN  --   --   --   --  3.2*           CARDIAC:  No results found      ENDO:  Recent Labs   Lab 02/22/24  0550   TSH 2.787           LIPIDS  No results found      URINE:  No results found      MICROBIOLOGY:  No results found for this or any previous visit.  No results found for this or any previous visit.    Imaging:     Other:     Current Functional status over the last 24 hours:    Supine - Sit       Supine to sit  supervision    Sit to supine  supervision          Transfers       Sit to stand  modified independent    Stand to sit  modified independent    Stand pivot  stand by assist          Gait and Wheelchair       Ambulation device  gait belt; 2-wheeled walker    Distance 1at trial  100    Distance 2nd trial  50    Distance 3rd trial  60    Assist level  stand by assist; contact guard/touching/steadying assist          Stairs       Number of stairs, trial 1 (ascend and desend together)  1    Device  gait belt    Rails  bilateral    Assist level  stand by assist; contact guard/touching/steadying assist          Self Cares       Oral hygiene  modified independent    Grooming  modified independent    Bathing  supervision    Upper body dressing   supervision  for retrieval    Lower body dressing  supervision    Footwear  supervision    Toileting  supervision    Toilet transfer  supervision          Feeding       None          Com/Cog       None                DIAGNOSIS:  Weakness generalized  (primary encounter diagnosis)  Gait abnormality  Other hypervolemia    Assessment:  1. Acute on chronic renal failure, nephrology following  2. Fluid overload requiring aggressive diuresis contributing to acute renal insufficiency, now stabilized   3. Electrolyte abnormality, stabilized  4. Aortic stenosis, patient with a degenerative bioprosthetic valve with planned TAVR on February 20th was aborted, reassessment of valvular function did not appear to warrant TAVR  5.  Debility, generalized weakness secondary to the above.    LACE(5-9):Moderate  Total Score: 6             1 LACE Length of Stay    2 LACE Charlson Comorbidity Index Evalution    3 LACE Visits to ED Previous 6 Months        Criteria that do not apply:    LACE Acute Admission            Expected Discharge Date:   Expected Discharge Time:      Plan:  1. Continue with therapies PT, OT  2. Continue current medications      I have considered how the patient's current medical status and co-morbidities are impacting their rehabilitation progress. The patient still requires close medical management. The patient continues to have functional deficits and continues to benefit from intensive inpatient therapies, rehabilitation nursing care and comprehensive discharge planning under the supervision of the Physiatrist.       Tiffanie Morrow M.D.

## 2024-02-27 DIAGNOSIS — F41.9 ANXIETY: ICD-10-CM

## 2024-02-27 DIAGNOSIS — F51.01 PRIMARY INSOMNIA: ICD-10-CM

## 2024-02-27 RX ORDER — CLONAZEPAM 0.5 MG/1
TABLET ORAL
Qty: 45 TABLET | Refills: 0 | Status: SHIPPED | OUTPATIENT
Start: 2024-02-27

## 2024-03-06 DIAGNOSIS — G47.00 INSOMNIA, UNSPECIFIED TYPE: ICD-10-CM

## 2024-03-06 RX ORDER — TRAZODONE HYDROCHLORIDE 50 MG/1
TABLET ORAL
Qty: 60 TABLET | Refills: 2 | Status: SHIPPED | OUTPATIENT
Start: 2024-03-06

## 2024-04-05 DIAGNOSIS — F51.01 PRIMARY INSOMNIA: ICD-10-CM

## 2024-04-05 DIAGNOSIS — F41.9 ANXIETY: ICD-10-CM

## 2024-04-08 RX ORDER — CLONAZEPAM 0.5 MG/1
TABLET ORAL
Qty: 45 TABLET | Refills: 0 | Status: SHIPPED | OUTPATIENT
Start: 2024-04-08

## 2024-04-19 ENCOUNTER — TELEPHONE (OUTPATIENT)
Dept: CARDIOLOGY | Facility: CLINIC | Age: 59
End: 2024-04-19
Payer: MEDICARE

## 2024-04-19 NOTE — TELEPHONE ENCOUNTER
I called the patient today and left a voicemail about rescheduling. Tl Dasilva now works with the electrophysiology department. Patient can be rescheduled to see NAOMI Rosales.     Thanks  Lora

## 2024-05-14 DIAGNOSIS — G43.109 MIGRAINE WITH AURA AND WITHOUT STATUS MIGRAINOSUS, NOT INTRACTABLE: ICD-10-CM

## 2024-05-14 DIAGNOSIS — G47.00 INSOMNIA, UNSPECIFIED TYPE: ICD-10-CM

## 2024-05-15 RX ORDER — RIZATRIPTAN BENZOATE 10 MG/1
TABLET ORAL
Qty: 12 TABLET | Refills: 1 | Status: SHIPPED | OUTPATIENT
Start: 2024-05-15

## 2024-05-15 RX ORDER — TRAZODONE HYDROCHLORIDE 50 MG/1
TABLET ORAL
Qty: 180 TABLET | Refills: 0 | Status: SHIPPED | OUTPATIENT
Start: 2024-05-15

## 2024-05-18 DIAGNOSIS — F51.01 PRIMARY INSOMNIA: ICD-10-CM

## 2024-05-18 DIAGNOSIS — F41.9 ANXIETY: ICD-10-CM

## 2024-05-20 RX ORDER — CLONAZEPAM 0.5 MG/1
TABLET ORAL
Qty: 45 TABLET | Refills: 0 | Status: SHIPPED | OUTPATIENT
Start: 2024-05-20

## 2024-06-06 ENCOUNTER — LAB (OUTPATIENT)
Dept: LAB | Facility: HOSPITAL | Age: 59
End: 2024-06-06
Payer: MEDICARE

## 2024-06-06 ENCOUNTER — OFFICE VISIT (OUTPATIENT)
Dept: CARDIOLOGY | Facility: CLINIC | Age: 59
End: 2024-06-06
Payer: MEDICARE

## 2024-06-06 VITALS
HEIGHT: 67 IN | WEIGHT: 163.8 LBS | DIASTOLIC BLOOD PRESSURE: 68 MMHG | HEART RATE: 65 BPM | BODY MASS INDEX: 25.71 KG/M2 | SYSTOLIC BLOOD PRESSURE: 104 MMHG

## 2024-06-06 DIAGNOSIS — R73.9 HYPERGLYCEMIA: ICD-10-CM

## 2024-06-06 DIAGNOSIS — R06.02 EXERTIONAL SHORTNESS OF BREATH: ICD-10-CM

## 2024-06-06 DIAGNOSIS — F41.1 GAD (GENERALIZED ANXIETY DISORDER): ICD-10-CM

## 2024-06-06 DIAGNOSIS — I10 BENIGN ESSENTIAL HYPERTENSION: Primary | ICD-10-CM

## 2024-06-06 DIAGNOSIS — R00.2 PALPITATIONS: ICD-10-CM

## 2024-06-06 DIAGNOSIS — E78.00 HYPERCHOLESTEROLEMIA: ICD-10-CM

## 2024-06-06 DIAGNOSIS — E78.5 HYPERLIPIDEMIA LDL GOAL <100: ICD-10-CM

## 2024-06-06 LAB
CHOLEST SERPL-MCNC: 137 MG/DL (ref 0–200)
HBA1C MFR BLD: 5.8 % (ref 4.8–5.6)
HDLC SERPL-MCNC: 65 MG/DL (ref 40–60)
LDLC SERPL CALC-MCNC: 61 MG/DL (ref 0–100)
LDLC/HDLC SERPL: 0.97 {RATIO}
NT-PROBNP SERPL-MCNC: <36 PG/ML (ref 0–900)
TRIGL SERPL-MCNC: 45 MG/DL (ref 0–150)
VLDLC SERPL-MCNC: 11 MG/DL (ref 5–40)

## 2024-06-06 PROCEDURE — 83880 ASSAY OF NATRIURETIC PEPTIDE: CPT

## 2024-06-06 PROCEDURE — 36415 COLL VENOUS BLD VENIPUNCTURE: CPT

## 2024-06-06 PROCEDURE — 83036 HEMOGLOBIN GLYCOSYLATED A1C: CPT

## 2024-06-06 PROCEDURE — 80061 LIPID PANEL: CPT

## 2024-06-06 RX ORDER — HYDROXYCHLOROQUINE SULFATE 200 MG/1
TABLET, FILM COATED ORAL
COMMUNITY
Start: 2024-04-18

## 2024-06-06 RX ORDER — CLOBETASOL PROPIONATE 0.5 MG/G
CREAM TOPICAL
COMMUNITY
Start: 2024-05-15

## 2024-06-06 NOTE — PROGRESS NOTES
"  Date of Office Visit: 2024  Encounter Provider: NAOMI Ratliff  Place of Service: [facility] CARDIOLOGY  Patient Name: Jaimee Leonardo  :1965    No chief complaint on file.  : hyperlipidemia    HPI: Jaimee Leonardo is a 59 y.o. female who is a patient of  Dr. Mary..  She is new to me today and presents for a 6 month office follow up.  She has a history of palpitations, hyperlipidemia and hypertension.  She was initially seen in 2023 for evaluation of shortness of air and palpitations.  She also had some intermittent lightheadedness when changing position.      Stress perfusion study was normal.  MCOT normal.  Ms. Leonardo presents today with no new complaints.  Continues to have occasional palpitations gets winded if she walks up stairs.  This is not new.  EKG is normal.  Blood pressure well-controlled.  States that her rheumatologist and her that she has a murmur that has gotten louder.  On auscultation, I do not hear a murmur.  He has dependent edema at the day that improves in the morning.  She notes that she is the \"unhealthiest vegetarian\".  She not watch sodium or sugar intake.  She also states that she does not cook.  Most recent lipid panel was last year and showed an LDL of 152.    Previous testing and notes have been reviewed by me.   Past Medical History:   Diagnosis Date    Allergic     Anxiety     Arthritis     Butler esophagus     Bradycardia     Colon polyp     Depression     Eye inflammation     GERD (gastroesophageal reflux disease)     Headache     Heart murmur     Hyperlipidemia     Hypertension     Irritable bowel syndrome     Low back pain     Palpitation     Tremor     Visual impairment        Past Surgical History:   Procedure Laterality Date    BACK SURGERY      CERVICAL DISC SURGERY  2017    C5-6 revision and C6-7 ACDF    COLONOSCOPY      COLONOSCOPY N/A 11/15/2023    Procedure: COLONOSCOPY TO CECUM;  Surgeon: Anahy" Elías FERRARI MD;  [place] [facility] [place];  Service: Gastroenterology;  Laterality: N/A;  ADEQUATE PREP, POLYP, HEMORRHOIDS    ENDOMETRIAL ABLATION      ENDOSCOPY N/A 11/15/2023    Procedure: ESOPHAGOGASTRODUODENOSCOPY;  Surgeon: Elías Higginbotham MD;  [place] [facility] [place];  Service: Gastroenterology;  Laterality: N/A;  BARRETTS ESOPHAGUS    JOINT REPLACEMENT      SPINE SURGERY      TRIGGER FINGER RELEASE Bilateral     TUBAL ABDOMINAL LIGATION      half    UPPER GASTROINTESTINAL ENDOSCOPY         Social History     Socioeconomic History    Marital status:    Tobacco Use    Smoking status: Former     Current packs/day: 0.00     Average packs/day: 0.5 packs/day for 30.0 years (15.0 ttl pk-yrs)     Types: Cigarettes     Start date: 9/15/1989     Quit date: 9/15/2019     Years since quittin.7     Passive exposure: Past    Smokeless tobacco: Never   Vaping Use    Vaping status: Never Used   Substance and Sexual Activity    Alcohol use: Yes     Comment: Maybe once a month    Drug use: No    Sexual activity: Yes     Partners: Male     Birth control/protection: None       Family History   Problem Relation Age of Onset    Colon polyps Mother     Stroke Mother     Anxiety disorder Mother     Arthritis Mother     Heart disease Mother         Atrial fibrillation    Hyperlipidemia Mother     Vision loss Mother         Glaucoma    Bone cancer Father     Alzheimer's disease Father     Arthritis Father     Cancer Father         Multiple myeloma    Colon cancer Maternal Grandfather     Cancer Maternal Grandfather         Colon    Hyperlipidemia Maternal Grandfather     Alcohol abuse Paternal Grandfather     Alcohol abuse Son     Anxiety disorder Son     Birth defects Son     Depression Son     Irritable bowel syndrome Son     Anxiety disorder Son     Crohn's disease Neg Hx     Ulcerative colitis Neg Hx        Review of Systems   Constitutional: Negative.   HENT: Negative.     Eyes: Negative.    Cardiovascular: Negative.     Respiratory: Negative.     Endocrine: Negative.    Hematologic/Lymphatic: Negative.    Skin: Negative.    Musculoskeletal: Negative.    Gastrointestinal: Negative.    Genitourinary: Negative.    Neurological: Negative.    Psychiatric/Behavioral: Negative.     Allergic/Immunologic: Negative.        Allergies   Allergen Reactions    Bee Venom Anaphylaxis    Morphine And Codeine     Rofecoxib          Current Outpatient Medications:     atorvastatin (LIPITOR) 20 MG tablet, Take 1 tablet by mouth Daily., Disp: 90 tablet, Rfl: 3    baclofen (LIORESAL) 10 MG tablet, , Disp: , Rfl:     bisacodyl (DULCOLAX) 5 MG EC tablet, Take 1 tablet by mouth Daily As Needed for Constipation., Disp: , Rfl:     clonazePAM (KlonoPIN) 0.5 MG tablet, TAKE 1/2 TABLET BY MOUTH EVERY MORNING AND TAKE ONE TABLET BY MOUTH EVERY EVENING, Disp: 45 tablet, Rfl: 0    diphenhydrAMINE (BENADRYL) 25 mg capsule, Take 1 capsule by mouth Every 6 (Six) Hours As Needed for Itching., Disp: , Rfl:     DULoxetine (CYMBALTA) 60 MG capsule, Take 1 capsule by mouth Daily., Disp: 90 capsule, Rfl: 1    EPINEPHrine (EpiPen 2-Uri) 0.3 MG/0.3ML solution auto-injector injection, 0.3 mL., Disp: , Rfl:     gabapentin (NEURONTIN) 600 MG tablet, 1 tablet 3 (Three) Times a Day., Disp: , Rfl:     irbesartan (Avapro) 75 MG tablet, Take 1 tablet by mouth Daily., Disp: 90 tablet, Rfl: 1    MULTIPLE VITAMINS ESSENTIAL PO, Take by mouth., Disp: , Rfl:     naloxone (NARCAN) 4 MG/0.1ML nasal spray, 1 spray into the nostril(s) as directed by provider As Needed for Opioid Reversal., Disp: 2 each, Rfl: 1    omeprazole (priLOSEC) 40 MG capsule, TAKE 1 CAPSULE BY MOUTH EVERY DAY, Disp: 90 capsule, Rfl: 1    oxyCODONE (ROXICODONE) 10 MG tablet, , Disp: , Rfl:     rizatriptan (MAXALT) 10 MG tablet, TAKE 1 TABLET BY MOUTH AT ONSET OF HEADACHE; MAY REPEAT 1 TABLET IN 2 HOURS IF NEEDED., Disp: 12 tablet, Rfl: 1    traZODone (DESYREL) 50 MG tablet, TAKE ONE TO TWO TABLETS BY MOUTH EVERY  NIGHT AT BEDTIME AS NEEDED FOR SLEEP, Disp: 180 tablet, Rfl: 0      Objective:     There were no vitals filed for this visit.  There is no height or weight on file to calculate BMI.    MCOT 11/22/2023:    A normal monitor study.    11 patient triggered and diary events submitted without any significant arrhythmia correlation.    Stress Perfusion Study 11/02/2023:    Myocardial perfusion imaging indicates a normal myocardial perfusion study with no evidence of ischemia.    Left ventricular ejection fraction is hyperdynamic (Calculated EF > 70%).    Impressions are consistent with a low risk study.    Breast attenuation and diaphragmatic attenuation artifacts are present.    Findings consistent with an indeterminate ECG stress test.    PHYSICAL EXAM:    Constitutional:       Appearance: Healthy appearance. Not in distress.   Neck:      Vascular: No JVR. JVD normal.   Pulmonary:      Effort: Pulmonary effort is normal.      Breath sounds: Normal breath sounds. No wheezing. No rhonchi. No rales.   Chest:      Chest wall: Not tender to palpatation.   Cardiovascular:      PMI at left midclavicular line. Normal rate. Regular rhythm. Normal S1. Normal S2.       Murmurs: There is no murmur.      No gallop.  No click. No rub.      Comments: Kevin LE dependent edema  Pulses:     Intact distal pulses.   Edema:     Peripheral edema absent.   Abdominal:      General: Bowel sounds are normal.      Palpations: Abdomen is soft.      Tenderness: There is no abdominal tenderness.   Musculoskeletal: Normal range of motion.         General: No tenderness. Skin:     General: Skin is warm and dry.   Neurological:      General: No focal deficit present.      Mental Status: Alert and oriented to person, place and time.           ECG 12 Lead    Date/Time: 6/6/2024 12:04 PM  Performed by: Trinh Bridges APRN    Authorized by: Trinh Bridges APRN  Comparison: compared with previous ECG from 9/6/2023  Similar to previous ECG  Rhythm:  sinus rhythm  Rate: normal  BPM: 65  Q waves: II, III and V6              Assessment:       Diagnosis Plan   1. Benign essential hypertension        2. Palpitations        3. JANELLE (generalized anxiety disorder)          No orders of the defined types were placed in this encounter.         Plan:       Palpitations: Normal MCOT 11/2023  Hypertension: controlled.  Will stop irbesartan at this time.  Patient states that she will monitor her blood pressure at home and let me know if he begins to elevate.  Dyspnea on exertion: Normal stress perfusion study.  Normal LVEF  JANELLE: on medication  Hyperlipidemia: Most recent lipid panel about 1 year ago showed .  On her office visit with Dr. Mary, he was started on atorvastatin 20 mg.  I am going to repeat her fasting lipid panel today and she is also going to have her HbA1c drawn as Dr. Mary had placed the order on her last visit.    Ms. Leonardo will follow-up with Dr. Mary in 1 year.  She will call sooner for any questions or concerns.     Your medication list            Accurate as of June 6, 2024 10:07 AM. If you have any questions, ask your nurse or doctor.                CONTINUE taking these medications        Instructions Last Dose Given Next Dose Due   atorvastatin 20 MG tablet  Commonly known as: LIPITOR      Take 1 tablet by mouth Daily.       baclofen 10 MG tablet  Commonly known as: LIORESAL           bisacodyl 5 MG EC tablet  Commonly known as: DULCOLAX      Take 1 tablet by mouth Daily As Needed for Constipation.       clonazePAM 0.5 MG tablet  Commonly known as: KlonoPIN      TAKE 1/2 TABLET BY MOUTH EVERY MORNING AND TAKE ONE TABLET BY MOUTH EVERY EVENING       diphenhydrAMINE 25 mg capsule  Commonly known as: BENADRYL      Take 1 capsule by mouth Every 6 (Six) Hours As Needed for Itching.       DULoxetine 60 MG capsule  Commonly known as: CYMBALTA      Take 1 capsule by mouth Daily.       EpiPen 2-Uri 0.3 MG/0.3ML solution auto-injector  injection  Generic drug: EPINEPHrine      0.3 mL.       gabapentin 600 MG tablet  Commonly known as: NEURONTIN      1 tablet 3 (Three) Times a Day.       irbesartan 75 MG tablet  Commonly known as: Avapro      Take 1 tablet by mouth Daily.       multivitamin tablet tablet  Commonly known as: THERAGRAN      Take by mouth.       naloxone 4 MG/0.1ML nasal spray  Commonly known as: NARCAN      1 spray into the nostril(s) as directed by provider As Needed for Opioid Reversal.       omeprazole 40 MG capsule  Commonly known as: priLOSEC      TAKE 1 CAPSULE BY MOUTH EVERY DAY       oxyCODONE 10 MG tablet  Commonly known as: ROXICODONE           rizatriptan 10 MG tablet  Commonly known as: MAXALT      TAKE 1 TABLET BY MOUTH AT ONSET OF HEADACHE; MAY REPEAT 1 TABLET IN 2 HOURS IF NEEDED.       traZODone 50 MG tablet  Commonly known as: DESYREL      TAKE ONE TO TWO TABLETS BY MOUTH EVERY NIGHT AT BEDTIME AS NEEDED FOR SLEEP                  As always, it has been a pleasure to participate in your patient's care.      Sincerely,       NAOMI Maldonado

## 2024-06-17 ENCOUNTER — TELEMEDICINE (OUTPATIENT)
Dept: BEHAVIORAL HEALTH | Facility: CLINIC | Age: 59
End: 2024-06-17
Payer: MEDICARE

## 2024-06-17 DIAGNOSIS — F33.1 MDD (MAJOR DEPRESSIVE DISORDER), RECURRENT EPISODE, MODERATE: ICD-10-CM

## 2024-06-17 DIAGNOSIS — G47.00 INSOMNIA, UNSPECIFIED TYPE: Primary | ICD-10-CM

## 2024-06-17 DIAGNOSIS — F41.1 GAD (GENERALIZED ANXIETY DISORDER): ICD-10-CM

## 2024-06-17 PROCEDURE — 1160F RVW MEDS BY RX/DR IN RCRD: CPT

## 2024-06-17 PROCEDURE — 1159F MED LIST DOCD IN RCRD: CPT

## 2024-06-17 PROCEDURE — 99214 OFFICE O/P EST MOD 30 MIN: CPT

## 2024-06-17 RX ORDER — TRAZODONE HYDROCHLORIDE 150 MG/1
150 TABLET ORAL NIGHTLY PRN
Qty: 90 TABLET | Refills: 4 | Status: SHIPPED | OUTPATIENT
Start: 2024-06-17

## 2024-06-17 NOTE — PROGRESS NOTES
Patient assessed today via MyChart through EPIC pt is at home in a secure environment and verbalizes privacy during interview. LINDA Ovalle is at home in a secure environment using a secure laptop.The patient's condition being diagnosed/treated is appropriate for telemedicine.The provider identified himself as well as his credentials.The patient, and/or patient's guardian, consent to be seen remotely, and when consent is given they understand that the consent allows for patient identifiable information to be sent to a third party as needed. They may refuse to be seen remotely at any time. The electronic data is encrypted and password protected, and the patient and/or guardian has been advised of the potential risks to privacy not withstanding such measures.    Subjective     Chief Complaint   Patient presents with    Insomnia     HPI:  Jaimee Leonardo, 59 y.o. pt presents to [facility] on 06/17/2024 for F/U, pt seen today for psychiatric med management of Insomnia . Pt last seen roughly 6 months prior, no medication changes at that time, since then patient reports things are about the same no big changes outside of some overwhelming fatigue that can be debilitating, unclear etiology, reports rheumatologist started her on a somewhat new medication and they do not know if that is the culprit, patient was referred to cardiologist for bradycardia last note reviewed and overall patient appears to be stable, patient requesting to change trazodone to 150 mg tablet has been taking 3 which I am fine with, Cymbalta in the past has been written by PCP as well as myself, Klonopin written by PCP, patient educated if we are not changing meds and she has been stable the last several appointments she can asked PCP to take over the trazodone but if not she is encouraged to follow-up with myself, make an appointment in 1 year only if needed or if things change can leave me a message.  Patient looking forward to an upcoming  girls trip to [place] reports it is [place] cool [place] town patient will be turning 59.    After appointment patient's pharmacy called asking if I was okay with patient taking Plaquenil and trazodone, Plaquenil was started by an outside doctor, patient has been on trazodone for some time, no high risk/warnings via EMR noted today, there is a small increased risk of arrhythmia when taking both medications, patient has been seen by cardiology very recently and per note is stable and not due to follow-up for 1 year.    Patient Active Problem List   Diagnosis    JANELLE (generalized anxiety disorder)    Butler's esophagus    Benign essential hypertension    Generalized osteoarthritis    Migraine with aura and without status migrainosus, not intractable    Insomnia    Irritable bowel syndrome    Failed back syndrome    Nicotine dependence    Hx of colonic polyp    S/P PICC central line placement    MDD (major depressive disorder), recurrent episode, moderate    Family history of colon cancer    Esophageal dysphagia    Encounter for screening for malignant neoplasm of colon    High risk medication use    Palpitations    Hyperlipidemia LDL goal <100     Social Status:  Ethnic Group: Not  Or   Race: White Or   Marital Status:    Employment status: Disabled        Social History     Socioeconomic History    Marital status:    Tobacco Use    Smoking status: Former     Current packs/day: 0.00     Average packs/day: 0.5 packs/day for 30.0 years (15.0 ttl pk-yrs)     Types: Cigarettes     Start date: 9/15/1989     Quit date: 9/15/2019     Years since quittin.7     Passive exposure: Past    Smokeless tobacco: Never   Vaping Use    Vaping status: Never Used   Substance and Sexual Activity    Alcohol use: Yes     Comment: Maybe once a month    Drug use: No    Sexual activity: Yes     Partners: Male     Birth control/protection: None     Past Medical History:    Diagnosis Date    Allergic     Anxiety     Arthritis     Butler esophagus     Bradycardia     Colon polyp     Depression     Eye inflammation     GERD (gastroesophageal reflux disease)     Headache     Heart murmur     Hyperlipidemia     Hypertension     Irritable bowel syndrome     Low back pain     Palpitation     Tremor     Visual impairment      Past Medical History Pertinent Negatives:   Diagnosis Date Noted    Hard to intubate 11/14/2023    Malignant hyperthermia due to anesthesia 11/14/2023    PONV (postoperative nausea and vomiting) 11/14/2023     Review of Systems   Constitutional:  Positive for fatigue.   Eyes:  Negative for double vision.   Respiratory:  Negative for chest tightness and shortness of breath.    Cardiovascular:  Positive for palpitations. Negative for chest pain.        RARE   Gastrointestinal:  Positive for indigestion. Negative for vomiting.   Musculoskeletal:  Positive for back pain.   Neurological:  Positive for tremors. Negative for dizziness and light-headedness.        RARE   Psychiatric/Behavioral:  Negative for agitation and suicidal ideas.      Objective   Physical Exam  Constitutional:       Appearance: Normal appearance.   HENT:      Head: Normocephalic.   Pulmonary:      Effort: Pulmonary effort is normal.   Skin:     General: Skin is dry.   Neurological:      Mental Status: She is alert and oriented to person, place, and time.     PHQ-9 Depression Screening  Little interest or pleasure in doing things? (P) 0-->not at all   Feeling down, depressed, or hopeless? (P) 1-->several days   Trouble falling or staying asleep, or sleeping too much? (P) 1-->several days   Feeling tired or having little energy?     Poor appetite or overeating? (P) 0-->not at all   Feeling bad about yourself/you are a failure/have let yourself or your family down? (P) 1-->several days   Trouble concentrating on things? (P) 0-->not at all   Psychomotor agitation/retardation (P) 0-->not at all   Thoughts  about death/dying/suicide (P) 0-->not at all   PHQ-9 Total Score (P) 6   How difficult have these problems for you? (P) somewhat difficult     GAD7 Documentation:  Feeling nervous, anxious or on edge (P) 1   Not being able to stop or control worrying (P) 1   Worrying too much about different things (P) 1   Trouble relaxing (P) 0   Being so restless that it is hard to sit still (P) 0   Becoming easily annoyed or irritable (P) 1   Feeling Afraid as if something awful might happen (P) 1   JANELLE Total Score (P) 5   How difficult have these problems made it for you? (P) Not difficult at all     MENTAL STATUS EXAM   General Appearance:  Cleanly groomed and dressed  Eye Contact:  Good eye contact  Attitude:  Cooperative  Speech:  Normal rate, tone, volume  Mood and affect:  Normal, pleasant  Thought Process:  Goal-directed  Associations/ Thought Content:  No delusions  Hallucinations:  None  Suicidal Ideations:  Not present  Homicidal Ideation:  Not present  Sensorium:  Alert  Orientation:  Person, place, time and situation  Attention Span/ Concentration:  Good  Fund of Knowledge:  Appropriate for age and educational level  Intellectual Functioning:  Above average  Insight:  Good  Judgement:  Good  Reliability:  Good  Impulse Control:  Good     LABS:  Lab Results   Component Value Date    GLUCOSE 110 (H) 05/26/2023    BUN 11 05/26/2023    CREATININE 0.59 05/26/2023    EGFRRESULT 104.0 05/26/2023    BCR 18.6 05/26/2023    K 5.4 (H) 05/26/2023    CO2 28.9 05/26/2023    CALCIUM 10.4 05/26/2023    PROTENTOTREF 6.7 05/26/2023    ALBUMIN 4.7 05/26/2023    BILITOT 0.3 05/26/2023    AST 22 05/26/2023    ALT 17 05/26/2023       CBC          8/25/2023    13:15 9/6/2023    11:22   CBC   WBC 3.13  4.44    RBC 4.21  4.34    Hemoglobin 12.3  12.4    Hematocrit 37.4  38.1    MCV 88.8  87.8    MCH 29.2  28.6    MCHC 32.9  32.5    RDW 12.5  12.2    Platelets 235  244       Allergies   Allergen Reactions    Bee Venom Anaphylaxis    Morphine  And Codeine     Rofecoxib Other (See Comments)     Swelling      Current Outpatient Medications   Medication Instructions    atorvastatin (LIPITOR) 20 mg, Oral, Daily    baclofen (LIORESAL) 10 MG tablet No dose, route, or frequency recorded.    bisacodyl (DULCOLAX) 5 mg, Oral, Daily PRN    clobetasol propionate (TEMOVATE) 0.05 % cream     clonazePAM (KlonoPIN) 0.5 MG tablet TAKE 1/2 TABLET BY MOUTH EVERY MORNING AND TAKE ONE TABLET BY MOUTH EVERY EVENING    diphenhydrAMINE (BENADRYL) 25 mg, Oral, Every 6 Hours PRN    DULoxetine (CYMBALTA) 60 mg, Oral, Daily    EPINEPHrine (EPIPEN 2-AEV) 0.3 mg    gabapentin (NEURONTIN) 600 mg, 3 Times Daily    hydroxychloroquine (PLAQUENIL) 200 MG tablet     MULTIPLE VITAMINS ESSENTIAL PO Oral    naloxone (NARCAN) 4 MG/0.1ML nasal spray 1 spray, Nasal, As Needed    omeprazole (priLOSEC) 40 MG capsule TAKE 1 CAPSULE BY MOUTH EVERY DAY    oxyCODONE (ROXICODONE) 10 MG tablet     rizatriptan (MAXALT) 10 MG tablet TAKE 1 TABLET BY MOUTH AT ONSET OF HEADACHE; MAY REPEAT 1 TABLET IN 2 HOURS IF NEEDED.    traZODone (DESYREL) 150 mg, Oral, Nightly PRN     Assessment    ASSESSMENT/TREATMENT PLAN/INSTRUCTIONS/EDUCATION    (G47.00) Insomnia, unspecified type - STABLE - Plan: CONTINUE traZODone (DESYREL) 150 MG tablet PRN SLEEP, pt will ask PCP if she can take over trazodone/cymbalta (to reduce amount of MD's she's needing to see) -    (F33.1) MDD (major depressive disorder), recurrent episode, moderate - stable - PLAN: CONTINUE CYMBALTA 60 mg/day -    (F41.1) JANELLE (generalized anxiety disorder) - stable - PLAN: CONTINUE Klonopin PRN (written by PCP) -    FOLLOW UP: Return if symptoms worsen or fail to improve.    MEDICATION ISSUES:  MONET: Reviewed 06/17/2024      Medications Discontinued During This Encounter   Medication Reason    traZODone (DESYREL) 50 MG tablet          New Medications Ordered This Visit   Medications    traZODone (DESYREL) 150 MG tablet     Sig: Take 1 tablet by mouth At  Night As Needed for Sleep.     Dispense:  90 tablet     Refill:  4         No orders of the defined types were placed in this encounter.    Barriers: medically complex, high risk medication and stress and chronic pain  Strengths:  motivated , positive attitude and knowledgeable about condition/medications/disease course     -Short-Term Goals: Pt will be compliant with medication management and note improvement in S/S over the next 4 to 6 weeks or at next scheduled visit.  -Long-Term Goals: Pt will be compliant with the agreed treatment plan including medication regimen & F/U appt's and deny impairment in daily functioning over the next 6 months.      -Progress toward goal:  At goal  -Functional Status: Mild impairment   -Prognosis: Good with Ongoing Treatment      Part of this note may be an electronic transcription/translation of spoken language to printed text using the Dragon Dictation System. Some of the data in this electronic note has been brought forward from a previous encounter, any necessary changes have been made, it has been reviewed by this APRN, and it is accurate.    This document has been electronically signed by NAOMI Bocanegra June 17, 2024 11:17 EDT

## 2024-06-20 DIAGNOSIS — F33.1 MDD (MAJOR DEPRESSIVE DISORDER), RECURRENT EPISODE, MODERATE: ICD-10-CM

## 2024-06-20 DIAGNOSIS — M15.9 GENERALIZED OSTEOARTHRITIS: ICD-10-CM

## 2024-06-20 DIAGNOSIS — G89.29 OTHER CHRONIC PAIN: ICD-10-CM

## 2024-06-20 DIAGNOSIS — F41.1 GAD (GENERALIZED ANXIETY DISORDER): ICD-10-CM

## 2024-06-20 RX ORDER — DULOXETIN HYDROCHLORIDE 60 MG/1
60 CAPSULE, DELAYED RELEASE ORAL DAILY
Qty: 90 CAPSULE | Refills: 1 | Status: SHIPPED | OUTPATIENT
Start: 2024-06-20

## 2024-06-24 DIAGNOSIS — F41.9 ANXIETY: ICD-10-CM

## 2024-06-24 DIAGNOSIS — F51.01 PRIMARY INSOMNIA: ICD-10-CM

## 2024-06-24 RX ORDER — CLONAZEPAM 0.5 MG/1
TABLET ORAL
Qty: 45 TABLET | Refills: 0 | Status: SHIPPED | OUTPATIENT
Start: 2024-06-24

## 2024-07-01 ENCOUNTER — TELEPHONE (OUTPATIENT)
Dept: CARDIOLOGY | Facility: CLINIC | Age: 59
End: 2024-07-01
Payer: MEDICARE

## 2024-07-01 RX ORDER — IRBESARTAN 75 MG/1
75 TABLET ORAL DAILY
COMMUNITY

## 2024-07-01 NOTE — TELEPHONE ENCOUNTER
7/1/24   Pt called for just FYI that she had to restart her Irbesartan 75 mg daily.   She states her b/p started to go back up.   She is OK on refills for now and will call if needs any refills sent to pharmacy.   She wanted to let know it was restarted and to add back to medication list.   I added medication back to med list.   [place] LINCOLN

## 2024-07-24 DIAGNOSIS — F51.01 PRIMARY INSOMNIA: ICD-10-CM

## 2024-07-24 DIAGNOSIS — F41.9 ANXIETY: ICD-10-CM

## 2024-07-25 RX ORDER — CLONAZEPAM 0.5 MG/1
TABLET ORAL
Qty: 45 TABLET | Refills: 0 | Status: SHIPPED | OUTPATIENT
Start: 2024-07-25

## 2024-08-12 RX ORDER — IRBESARTAN 75 MG/1
75 TABLET ORAL DAILY
Qty: 90 TABLET | Refills: 0 | Status: SHIPPED | OUTPATIENT
Start: 2024-08-12

## 2024-09-09 ENCOUNTER — TELEPHONE (OUTPATIENT)
Dept: FAMILY MEDICINE CLINIC | Facility: CLINIC | Age: 59
End: 2024-09-09

## 2024-09-09 ENCOUNTER — OFFICE VISIT (OUTPATIENT)
Dept: FAMILY MEDICINE CLINIC | Facility: CLINIC | Age: 59
End: 2024-09-09
Payer: MEDICARE

## 2024-09-09 VITALS
DIASTOLIC BLOOD PRESSURE: 90 MMHG | RESPIRATION RATE: 18 BRPM | WEIGHT: 162.5 LBS | SYSTOLIC BLOOD PRESSURE: 138 MMHG | HEART RATE: 53 BPM | HEIGHT: 67 IN | BODY MASS INDEX: 25.51 KG/M2 | OXYGEN SATURATION: 99 %

## 2024-09-09 DIAGNOSIS — R26.89 BALANCE PROBLEM: ICD-10-CM

## 2024-09-09 DIAGNOSIS — R53.83 MALAISE AND FATIGUE: ICD-10-CM

## 2024-09-09 DIAGNOSIS — R25.1 TREMORS OF NERVOUS SYSTEM: ICD-10-CM

## 2024-09-09 DIAGNOSIS — Z00.00 MEDICARE ANNUAL WELLNESS VISIT, SUBSEQUENT: Primary | ICD-10-CM

## 2024-09-09 DIAGNOSIS — H93.12 TINNITUS OF LEFT EAR: ICD-10-CM

## 2024-09-09 DIAGNOSIS — I35.8 AORTIC DIASTOLIC MURMUR: ICD-10-CM

## 2024-09-09 DIAGNOSIS — T14.8XXA SPLINTER IN SKIN: ICD-10-CM

## 2024-09-09 DIAGNOSIS — R53.81 MALAISE AND FATIGUE: ICD-10-CM

## 2024-09-09 DIAGNOSIS — F41.1 GAD (GENERALIZED ANXIETY DISORDER): ICD-10-CM

## 2024-09-09 DIAGNOSIS — F51.01 PRIMARY INSOMNIA: ICD-10-CM

## 2024-09-09 PROBLEM — Z95.828 S/P PICC CENTRAL LINE PLACEMENT: Status: RESOLVED | Noted: 2021-04-05 | Resolved: 2024-09-09

## 2024-09-09 RX ORDER — CLONAZEPAM 0.5 MG/1
TABLET ORAL
Qty: 45 TABLET | Refills: 0 | Status: SHIPPED | OUTPATIENT
Start: 2024-09-09

## 2024-09-09 RX ORDER — MUPIROCIN CALCIUM 20 MG/G
1 CREAM TOPICAL 3 TIMES DAILY
Qty: 30 G | Refills: 0 | Status: SHIPPED | OUTPATIENT
Start: 2024-09-09 | End: 2024-09-09

## 2024-09-09 RX ORDER — MUPIROCIN 20 MG/G
1 OINTMENT TOPICAL 3 TIMES DAILY
Qty: 30 G | Refills: 0 | Status: SHIPPED | OUTPATIENT
Start: 2024-09-09

## 2024-09-09 NOTE — PROGRESS NOTES
"Subjective   The ABCs of the Annual Wellness Visit  Medicare Wellness Visit      Jaimee Leonardo is a 59 y.o. patient who presents for a Medicare Wellness Visit.    Compared to one year ago, the patient feels her physical health is worse and her mental health is worse.    Recent Hospitalizations:  She was not admitted to the hospital during the last year.     Current Medical Providers:  Patient Care Team:  Saumya Hurd MD as PCP - General (Family Medicine)  Piotr Boo APRN as Nurse Practitioner (Psychiatry)  Lauren Thomason APRN as Nurse Practitioner (Gastroenterology)    Opioid medication/s are on active medication list. She is treated by pain medicine.     Aspirin is not on active medication list.  Aspirin use is not indicated based on review of current medical condition/s. Risk of harm outweighs potential benefits.  .  Advance Care Planning Advance Directive is not on file.  ACP discussion was held with the patient during this visit. Patient does not have an advance directive, information provided.          Objective   Vitals:    24 1417   BP: 138/90   Pulse: 53   Resp: 18   SpO2: 99%   Weight: 73.7 kg (162 lb 8 oz)   Height: 170.2 cm (67.01\")       Estimated body mass index is 25.44 kg/m² as calculated from the following:    Height as of this encounter: 170.2 cm (67.01\").    Weight as of this encounter: 73.7 kg (162 lb 8 oz).      Does the patient have evidence of cognitive impairment? No                                                                                                Health  Risk Assessment    Smoking Status:  Social History     Tobacco Use   Smoking Status Former    Current packs/day: 0.00    Average packs/day: 0.5 packs/day for 30.0 years (15.0 ttl pk-yrs)    Types: Cigarettes    Start date: 9/15/1989    Quit date: 9/15/2019    Years since quittin.9    Passive exposure: Past   Smokeless Tobacco Never     Alcohol Consumption:  Social History     Substance and Sexual " Activity   Alcohol Use Yes    Comment: Maybe once a month       Fall Risk Navigator Hyperlink  KIM Fall Risk Assessment was completed, and patient is at LOW risk for falls.Assessment completed on:2024    Depression Screenin/9/2024     2:22 PM   PHQ-2/PHQ-9 Depression Screening   Little Interest or Pleasure in Doing Things 0-->not at all   Feeling Down, Depressed or Hopeless 0-->not at all   PHQ-9: Brief Depression Severity Measure Score 0     Health Habits and Functional and Cognitive Screenin/8/2024     8:32 PM   Functional & Cognitive Status   Do you have difficulty preparing food and eating? No   Do you have difficulty bathing yourself, getting dressed or grooming yourself? No   Do you have difficulty using the toilet? No   Do you have difficulty moving around from place to place? No   Do you have trouble with steps or getting out of a bed or a chair? No   Current Diet Unhealthy Diet   Dental Exam Up to date   Eye Exam Not up to date   Exercise (times per week) 0 times per week   Current Exercises Include Gardening;Swimming   Do you need help using the phone?  No   Are you deaf or do you have serious difficulty hearing?  No   Do you need help to go to places out of walking distance? No   Do you need help shopping? No   Do you need help preparing meals?  No   Do you need help with housework?  Yes - due to her pain issues.    Do you need help with laundry? No   Do you need help taking your medications? No   Do you need help managing money? No   Do you ever drive or ride in a car without wearing a seat belt? No   Have you felt unusual stress, anger or loneliness in the last month? No   Who do you live with? Spouse   If you need help, do you have trouble finding someone available to you? No   Have you been bothered in the last four weeks by sexual problems? No   Do you have difficulty concentrating, remembering or making decisions? Yes - this is a recent problem.              Age-appropriate  Screening Schedule:  Orders for future screening recommendations based on patient's age, sex and/or medical conditions are listed in the plan section. The patient has been provided with a written plan.  ___________________________________________                                                                                                                                           CMS Preventative Services Quick Reference  Risk Factors Identified During Encounter  None Identified    The above risks/problems have been discussed with the patient.  Pertinent information has been shared with the patient in the After Visit Summary.  An After Visit Summary and PPPS were made available to the patient.    Follow Up:   Next Medicare Wellness visit to be scheduled in 1 year.       Additional E&M Note during same encounter follows:  Patient has additional, significant, and separately identifiable condition(s)/problem(s) that require work above and beyond the Medicare Wellness Visit          Assessment & Plan  1. Fatigue.  She reports severe episodes of fatigue that impair her ability to function, occurring after physical activity. Baseline blood work, including thyroid function tests and CBC, will be conducted to rule out thyroid dysfunction and anemia. A Lyme disease titer will also be performed due to her history of tick bites. Neurology will be seeing her for other issues as well.     2. Tremors.  She describes tremors that feel like her whole body is shaking, although they are not visibly noticeable. These tremors are becoming more frequent and lasting longer. A referral to a neurologist is recommended for further evaluation.    3. Tinnitus.  She reports pulsatile tinnitus in her left ear. An ear examination will be conducted to rule out any local causes. This can be evaluated by Neuro.    4. Balance issues.  She reports increased balance issues, including difficulty walking straight and occasional falls. Baseline blood  work will be conducted to rule out systemic causes. A referral to a neurologist is recommended for further evaluation.    5. Heart murmur.  She reports a heart murmur that has been noted to be louder when leaning forward. An echocardiogram has been ordered to evaluate her cardiac function.    6. Medication Management.  She requests a refill for clonazepam, which she takes at night along with trazodone to help with sleep. A prescription for clonazepam will be sent to her pharmacy.    7. Splinter injury.  She reports a recent splinter injury that was removed but is still sore. Mupirocin ointment has been prescribed to prevent infection.      Orders Placed This Encounter   Procedures    TSH    Comprehensive metabolic panel    Lyme Disease Total Antibody With Reflex to Immunoassay    Adult Transthoracic Echo Complete W/ Cont if Necessary Per Protocol    CBC & Differential     New Medications Ordered This Visit   Medications    clonazePAM (KlonoPIN) 0.5 MG tablet     Sig: TAKE 1/2 TABLET BY MOUTH EVERY MORNING AND TAKE ONE TABLET BY MOUTH EVERY EVENING     Dispense:  45 tablet     Refill:  0     Assessment & Plan  JANELLE (generalized anxiety disorder)           Jaimee is a 59 y.o. being seen today for  Annual Exam   HISTORY    HPI  History of Present Illness  The patient is a 59-year-old female who presents for evaluation of multiple medical concerns.    She has been experiencing episodes of severe fatigue, which typically occur after physical activity. These episodes have become more frequent and intense, to the point where she had to stop driving due to her inability to focus on the road. She also reports that she can no longer ride her horse for more than 30 minutes without feeling like she might fall off.    She has a history of tremors, which she believes may be genetic as her brother also has them. Recently, she has noticed whole-body tremors, although they are not visible to others.    She has started experiencing  tinnitus, hearing a swishing sound in her left ear. She has been having balance issues and wonders if this could be related to her ear problem. She has been bitten by ticks and mosquitoes and wonders if her symptoms could be related to Lyme disease.    She has been sleeping better since starting clonazepam and trazodone.    She has a diastolic murmur, which Dr. Ayala said had gotten louder. She feels her heart beating hard. Her blood sugar is low.    She recently got a splinter in her hand, which was removed but is still causing pain.    She has talked to pain management about her tiredness and they suggested she might have hyperactivity and recommended Ritalin, but she declined.    She has made dietary changes to improve her cholesterol levels.    She has an upcoming dentist appointment and has been seeing an eye doctor for dry eyes. She does not have a living will. She quit smoking years ago.  Social History  She  reports that she quit smoking about 4 years ago. Her smoking use included cigarettes. She started smoking about 35 years ago. She has a 15 pack-year smoking history. She has been exposed to tobacco smoke. She has never used smokeless tobacco. She reports current alcohol use. She reports that she does not use drugs.  EXAM DATA    Vital Signs        BP Readings from Last 1 Encounters:   09/09/24 138/90     Wt Readings from Last 3 Encounters:   09/09/24 73.7 kg (162 lb 8 oz)   06/06/24 74.3 kg (163 lb 12.8 oz)   02/12/24 74.8 kg (165 lb)   Body mass index is 25.44 kg/m².  Physical Exam  Vitals reviewed.   Constitutional:       Appearance: Normal appearance. She is well-developed.   Cardiovascular:      Rate and Rhythm: Normal rate and regular rhythm.      Heart sounds: Normal heart sounds.   Pulmonary:      Effort: Pulmonary effort is normal.      Breath sounds: Normal breath sounds.   Skin:     Comments: Right hand, dressed appropriately and healing well. No erythema or swelling.    Neurological:       Mental Status: She is alert.   Psychiatric:         Behavior: Behavior normal.         Thought Content: Thought content normal.         Judgment: Judgment normal.             Patient or patient representative verbalized consent for the use of Ambient Listening during the visit with  Saumya Hurd MD for chart documentation. 9/10/2024  15:08 EDT

## 2024-09-09 NOTE — TELEPHONE ENCOUNTER
Pharmacy Name:      [facility]       Reference Number (if applicable):     Pharmacy representative name: BRIANDA    Pharmacy representative phone number:     What medication are you calling in regards to: mupirocin (BACTROBAN) 2 % cream     What question does the pharmacy have: PHARMACIST CALLING STATING THAT THE CREAM IS MORE EXPENSIVE THAN THE OINTMENT HE WANTED TO MAKE  AWARE AND TO SEE IF SHE WANTED TO SWITCH TO THE OINTMENT    Who is the provider that prescribed the medication:      Additional notes: PLEASE ADVISE

## 2024-09-09 NOTE — PATIENT INSTRUCTIONS
Advance Care Planning and Advance Directives     You make decisions on a daily basis - decisions about where you want to live, your career, your home, your life. Perhaps one of the most important decisions you face is your choice for future medical care. Take time to talk with your family and your healthcare team and start planning today.  Advance Care Planning is a process that can help you:  Understand possible future healthcare decisions in light of your own experiences  Reflect on those decision in light of your goals and values  Discuss your decisions with those closest to you and the healthcare professionals that care for you  Make a plan by creating a document that reflects your wishes    Surrogate Decision Maker  In the event of a medical emergency, which has left you unable to communicate or to make your own decisions, you would need someone to make decisions for you.  It is important to discuss your preferences for medical treatment with this person while you are in good health.     Qualities of a surrogate decision maker:  Willing to take on this role and responsibility  Knows what you want for future medical care  Willing to follow your wishes even if they don't agree with them  Able to make difficult medical decisions under stressful circumstances    Advance Directives  These are legal documents you can create that will guide your healthcare team and decision maker(s) when needed. These documents can be stored in the electronic medical record.    Living Will - a legal document to guide your care if you have a terminal condition or a serious illness and are unable to communicate. States vary by statute in document names/types, but most forms may include one or more of the following:        -  Directions regarding life-prolonging treatments        -  Directions regarding artificially provided nutrition/hydration        -  Choosing a healthcare decision maker        -  Direction regarding organ/tissue  donation    Durable Power of  for Healthcare - this document names an -in-fact to make medical decisions for you, but it may also allow this person to make personal and financial decisions for you. Please seek the advice of an  if you need this type of document.    **Advance Directives are not required and no one may discriminate against you if you do not sign one.    Medical Orders  Many states allow specific forms/orders signed by your physician to record your wishes for medical treatment in your current state of health. This form, signed in personal communication with your physician, addresses resuscitation and other medical interventions that you may or may not want.      For more information or to schedule a time with a [facility] Advance Care Planning Facilitator contact: [email or web address] or call [phone] and someone will contact you directly.

## 2024-09-10 LAB
ALBUMIN SERPL-MCNC: 4.3 G/DL (ref 3.5–5.2)
ALBUMIN/GLOB SERPL: 2.3 G/DL
ALP SERPL-CCNC: 62 U/L (ref 39–117)
ALT SERPL-CCNC: 24 U/L (ref 1–33)
AST SERPL-CCNC: 32 U/L (ref 1–32)
BASOPHILS # BLD AUTO: 0.08 10*3/MM3 (ref 0–0.2)
BASOPHILS NFR BLD AUTO: 1.7 % (ref 0–1.5)
BILIRUB SERPL-MCNC: 0.3 MG/DL (ref 0–1.2)
BUN SERPL-MCNC: 5 MG/DL (ref 8–23)
BUN/CREAT SERPL: 7.8 (ref 7–25)
CALCIUM SERPL-MCNC: 9.5 MG/DL (ref 8.6–10.5)
CHLORIDE SERPL-SCNC: 103 MMOL/L (ref 98–107)
CO2 SERPL-SCNC: 26.4 MMOL/L (ref 22–29)
CREAT SERPL-MCNC: 0.64 MG/DL (ref 0.57–1)
EGFRCR SERPLBLD CKD-EPI 2021: 101.3 ML/MIN/1.73
EOSINOPHIL # BLD AUTO: 0.04 10*3/MM3 (ref 0–0.4)
EOSINOPHIL NFR BLD AUTO: 0.8 % (ref 0.3–6.2)
ERYTHROCYTE [DISTWIDTH] IN BLOOD BY AUTOMATED COUNT: 12.1 % (ref 12.3–15.4)
GLOBULIN SER CALC-MCNC: 1.9 GM/DL
GLUCOSE SERPL-MCNC: 90 MG/DL (ref 65–99)
HCT VFR BLD AUTO: 37.3 % (ref 34–46.6)
HGB BLD-MCNC: 12.4 G/DL (ref 12–15.9)
IMM GRANULOCYTES # BLD AUTO: 0 10*3/MM3 (ref 0–0.05)
IMM GRANULOCYTES NFR BLD AUTO: 0 % (ref 0–0.5)
LYMPHOCYTES # BLD AUTO: 1.44 10*3/MM3 (ref 0.7–3.1)
LYMPHOCYTES NFR BLD AUTO: 29.8 % (ref 19.6–45.3)
MCH RBC QN AUTO: 30.2 PG (ref 26.6–33)
MCHC RBC AUTO-ENTMCNC: 33.2 G/DL (ref 31.5–35.7)
MCV RBC AUTO: 90.8 FL (ref 79–97)
MONOCYTES # BLD AUTO: 0.36 10*3/MM3 (ref 0.1–0.9)
MONOCYTES NFR BLD AUTO: 7.4 % (ref 5–12)
NEUTROPHILS # BLD AUTO: 2.92 10*3/MM3 (ref 1.7–7)
NEUTROPHILS NFR BLD AUTO: 60.3 % (ref 42.7–76)
NRBC BLD AUTO-RTO: 0 /100 WBC (ref 0–0.2)
PLATELET # BLD AUTO: 229 10*3/MM3 (ref 140–450)
POTASSIUM SERPL-SCNC: 4.5 MMOL/L (ref 3.5–5.2)
PROT SERPL-MCNC: 6.2 G/DL (ref 6–8.5)
RBC # BLD AUTO: 4.11 10*6/MM3 (ref 3.77–5.28)
SODIUM SERPL-SCNC: 139 MMOL/L (ref 136–145)
TSH SERPL DL<=0.005 MIU/L-ACNC: 1.52 UIU/ML (ref 0.27–4.2)
WBC # BLD AUTO: 4.84 10*3/MM3 (ref 3.4–10.8)

## 2024-09-11 LAB — B BURGDOR IGG+IGM SER QL IA: NEGATIVE

## 2024-09-24 ENCOUNTER — HOSPITAL ENCOUNTER (OUTPATIENT)
Dept: CARDIOLOGY | Facility: HOSPITAL | Age: 59
Discharge: HOME | End: 2024-09-24
Admitting: FAMILY MEDICINE
Payer: MEDICARE

## 2024-09-24 DIAGNOSIS — I35.8 AORTIC DIASTOLIC MURMUR: ICD-10-CM

## 2024-09-24 PROCEDURE — 93306 TTE W/DOPPLER COMPLETE: CPT

## 2024-09-27 LAB
AORTIC DIMENSIONLESS INDEX: 0.8 (DI)
ASCENDING AORTA: 3.1 CM
BH CV ECHO MEAS - ACS: 1.71 CM
BH CV ECHO MEAS - AO MAX PG: 9.6 MMHG
BH CV ECHO MEAS - AO MEAN PG: 5.3 MMHG
BH CV ECHO MEAS - AO ROOT DIAM: 3 CM
BH CV ECHO MEAS - AO V2 MAX: 154.7 CM/SEC
BH CV ECHO MEAS - AO V2 VTI: 30.5 CM
BH CV ECHO MEAS - AVA(I,D): 2.9 CM2
BH CV ECHO MEAS - EDV(CUBED): 114.6 ML
BH CV ECHO MEAS - EDV(MOD-SP2): 116 ML
BH CV ECHO MEAS - EDV(MOD-SP4): 112 ML
BH CV ECHO MEAS - EF(MOD-BP): 68.3 %
BH CV ECHO MEAS - EF(MOD-SP2): 68.9 %
BH CV ECHO MEAS - EF(MOD-SP4): 67.9 %
BH CV ECHO MEAS - ESV(CUBED): 27.9 ML
BH CV ECHO MEAS - ESV(MOD-SP2): 36.1 ML
BH CV ECHO MEAS - ESV(MOD-SP4): 35.9 ML
BH CV ECHO MEAS - FS: 37.6 %
BH CV ECHO MEAS - IVS/LVPW: 1.01 CM
BH CV ECHO MEAS - IVSD: 0.79 CM
BH CV ECHO MEAS - LA DIMENSION: 3.4 CM
BH CV ECHO MEAS - LAT PEAK E' VEL: 9.4 CM/SEC
BH CV ECHO MEAS - LV MASS(C)D: 126.2 GRAMS
BH CV ECHO MEAS - LV MAX PG: 5.6 MMHG
BH CV ECHO MEAS - LV MEAN PG: 2.9 MMHG
BH CV ECHO MEAS - LV V1 MAX: 117.8 CM/SEC
BH CV ECHO MEAS - LV V1 VTI: 24.6 CM
BH CV ECHO MEAS - LVIDD: 4.9 CM
BH CV ECHO MEAS - LVIDS: 3 CM
BH CV ECHO MEAS - LVOT AREA: 3.6 CM2
BH CV ECHO MEAS - LVOT DIAM: 2.13 CM
BH CV ECHO MEAS - LVPWD: 0.78 CM
BH CV ECHO MEAS - MED PEAK E' VEL: 10.1 CM/SEC
BH CV ECHO MEAS - MV A MAX VEL: 88.7 CM/SEC
BH CV ECHO MEAS - MV DEC SLOPE: 394.1 CM/SEC2
BH CV ECHO MEAS - MV DEC TIME: 0.24 SEC
BH CV ECHO MEAS - MV E MAX VEL: 75 CM/SEC
BH CV ECHO MEAS - MV E/A: 0.85
BH CV ECHO MEAS - MV MAX PG: 3.9 MMHG
BH CV ECHO MEAS - MV MEAN PG: 1.32 MMHG
BH CV ECHO MEAS - MV P1/2T: 68.6 MSEC
BH CV ECHO MEAS - MV V2 VTI: 29.9 CM
BH CV ECHO MEAS - MVA(P1/2T): 3.2 CM2
BH CV ECHO MEAS - MVA(VTI): 2.9 CM2
BH CV ECHO MEAS - PA V2 MAX: 116.7 CM/SEC
BH CV ECHO MEAS - PULM A REVS DUR: 0.15 SEC
BH CV ECHO MEAS - PULM A REVS VEL: 28.7 CM/SEC
BH CV ECHO MEAS - PULM DIAS VEL: 39 CM/SEC
BH CV ECHO MEAS - PULM S/D: 1.35
BH CV ECHO MEAS - PULM SYS VEL: 52.7 CM/SEC
BH CV ECHO MEAS - QP/QS: 0.64
BH CV ECHO MEAS - RV MAX PG: 4.7 MMHG
BH CV ECHO MEAS - RV V1 MAX: 108 CM/SEC
BH CV ECHO MEAS - RV V1 VTI: 19.6 CM
BH CV ECHO MEAS - RVDD: 3.6 CM
BH CV ECHO MEAS - RVOT DIAM: 1.91 CM
BH CV ECHO MEAS - SV(LVOT): 88 ML
BH CV ECHO MEAS - SV(MOD-SP2): 79.9 ML
BH CV ECHO MEAS - SV(MOD-SP4): 76.1 ML
BH CV ECHO MEAS - SV(RVOT): 56.3 ML
BH CV ECHO MEAS - TAPSE (>1.6): 1.69 CM
BH CV ECHO MEASUREMENTS AVERAGE E/E' RATIO: 7.69
BH CV XLRA - TDI S': 13.3 CM/SEC
IVRT: 58 MS
LEFT ATRIUM VOLUME INDEX: 20.9 ML/M2

## 2024-10-03 DIAGNOSIS — F51.01 PRIMARY INSOMNIA: ICD-10-CM

## 2024-10-03 RX ORDER — CLONAZEPAM 0.5 MG/1
TABLET ORAL
Qty: 45 TABLET | Refills: 0 | Status: SHIPPED | OUTPATIENT
Start: 2024-10-03

## 2024-10-03 RX ORDER — ATORVASTATIN CALCIUM 20 MG/1
20 TABLET, FILM COATED ORAL DAILY
Qty: 90 TABLET | Refills: 3 | Status: SHIPPED | OUTPATIENT
Start: 2024-10-03

## 2024-10-20 NOTE — TELEPHONE ENCOUNTER
Last visit 2/16/23  No f/u scheduled    PAST SURGICAL HISTORY:  No significant past surgical history

## 2024-11-11 DIAGNOSIS — F51.01 PRIMARY INSOMNIA: ICD-10-CM

## 2024-11-11 RX ORDER — IRBESARTAN 75 MG/1
75 TABLET ORAL DAILY
Qty: 90 TABLET | Refills: 0 | Status: SHIPPED | OUTPATIENT
Start: 2024-11-11

## 2024-11-12 RX ORDER — CLONAZEPAM 0.5 MG/1
TABLET ORAL
Qty: 45 TABLET | Refills: 0 | Status: SHIPPED | OUTPATIENT
Start: 2024-11-12

## 2024-12-16 DIAGNOSIS — F51.01 PRIMARY INSOMNIA: ICD-10-CM

## 2024-12-16 RX ORDER — CLONAZEPAM 0.5 MG/1
TABLET ORAL
Qty: 45 TABLET | Refills: 0 | Status: SHIPPED | OUTPATIENT
Start: 2024-12-16

## 2024-12-30 DIAGNOSIS — F33.1 MDD (MAJOR DEPRESSIVE DISORDER), RECURRENT EPISODE, MODERATE: ICD-10-CM

## 2024-12-30 DIAGNOSIS — M15.9 GENERALIZED OSTEOARTHRITIS: ICD-10-CM

## 2024-12-30 DIAGNOSIS — F41.1 GAD (GENERALIZED ANXIETY DISORDER): ICD-10-CM

## 2024-12-30 DIAGNOSIS — G89.29 OTHER CHRONIC PAIN: ICD-10-CM

## 2024-12-30 RX ORDER — DULOXETIN HYDROCHLORIDE 60 MG/1
60 CAPSULE, DELAYED RELEASE ORAL DAILY
Qty: 90 CAPSULE | Refills: 1 | Status: SHIPPED | OUTPATIENT
Start: 2024-12-30

## 2024-12-30 RX ORDER — ATORVASTATIN CALCIUM 20 MG/1
20 TABLET, FILM COATED ORAL DAILY
Qty: 90 TABLET | Refills: 3 | Status: SHIPPED | OUTPATIENT
Start: 2024-12-30

## 2024-12-30 NOTE — TELEPHONE ENCOUNTER
Caller: Jaimee Leonardo    Relationship: Self    Best call back number: [phone]     Requested Prescriptions:   Requested Prescriptions     Pending Prescriptions Disp Refills    DULoxetine (CYMBALTA) 60 MG capsule 90 capsule 1     Sig: Take 1 capsule by mouth Daily.    atorvastatin (LIPITOR) 20 MG tablet 90 tablet 3     Sig: Take 1 tablet by mouth Daily.        Pharmacy where request should be sent: [id number] [place], [place] - [address] AT [facility] [address] [facility] [phone] [facility] [phone]      Last office visit with prescribing clinician: 9/9/2024   Last telemedicine visit with prescribing clinician: Visit date not found   Next office visit with prescribing clinician: Visit date not found     Additional details provided by patient: PATIENT HAS 3 DAYS OF MEDICATION LEFT.     Does the patient have less than a 3 day supply:  [x] Yes  [] No    Would you like a call back once the refill request has been completed: [] Yes [x] No    If the office needs to give you a call back, can they leave a voicemail: [] Yes [x] No    Chandrakant Hassan Rep   12/30/24 11:42 EST

## 2025-01-14 DIAGNOSIS — F51.01 PRIMARY INSOMNIA: ICD-10-CM

## 2025-01-14 DIAGNOSIS — G43.109 MIGRAINE WITH AURA AND WITHOUT STATUS MIGRAINOSUS, NOT INTRACTABLE: ICD-10-CM

## 2025-01-15 RX ORDER — RIZATRIPTAN BENZOATE 10 MG/1
TABLET ORAL
Qty: 12 TABLET | Refills: 1 | Status: SHIPPED | OUTPATIENT
Start: 2025-01-15

## 2025-01-15 RX ORDER — CLONAZEPAM 0.5 MG/1
TABLET ORAL
Qty: 45 TABLET | Refills: 0 | Status: SHIPPED | OUTPATIENT
Start: 2025-01-15

## 2025-02-07 RX ORDER — IRBESARTAN 75 MG/1
75 TABLET ORAL DAILY
Qty: 90 TABLET | Refills: 0 | Status: SHIPPED | OUTPATIENT
Start: 2025-02-07

## 2025-02-25 DIAGNOSIS — F51.01 PRIMARY INSOMNIA: ICD-10-CM

## 2025-02-26 RX ORDER — CLONAZEPAM 0.5 MG/1
TABLET ORAL
Qty: 45 TABLET | Refills: 0 | Status: SHIPPED | OUTPATIENT
Start: 2025-02-26

## 2025-03-31 DIAGNOSIS — F51.01 PRIMARY INSOMNIA: ICD-10-CM

## 2025-03-31 RX ORDER — CLONAZEPAM 0.5 MG/1
TABLET ORAL
Qty: 45 TABLET | Refills: 0 | Status: SHIPPED | OUTPATIENT
Start: 2025-03-31

## 2025-05-08 DIAGNOSIS — F51.01 PRIMARY INSOMNIA: ICD-10-CM

## 2025-05-08 RX ORDER — CLONAZEPAM 0.5 MG/1
TABLET ORAL
Qty: 45 TABLET | Refills: 0 | Status: SHIPPED | OUTPATIENT
Start: 2025-05-08

## 2025-05-14 RX ORDER — IRBESARTAN 75 MG/1
75 TABLET ORAL DAILY
Qty: 90 TABLET | Refills: 0 | Status: SHIPPED | OUTPATIENT
Start: 2025-05-14

## 2025-05-29 ENCOUNTER — LAB (OUTPATIENT)
Dept: LAB | Facility: HOSPITAL | Age: 60
End: 2025-05-29
Payer: MEDICARE

## 2025-05-29 ENCOUNTER — TRANSCRIBE ORDERS (OUTPATIENT)
Dept: LAB | Facility: HOSPITAL | Age: 60
End: 2025-05-29
Payer: MEDICARE

## 2025-05-29 DIAGNOSIS — M79.642 LEFT HAND PAIN: ICD-10-CM

## 2025-05-29 DIAGNOSIS — M79.641 RIGHT HAND PAIN: ICD-10-CM

## 2025-05-29 DIAGNOSIS — M06.09 RHEUMATOID ARTHRITIS OF MULTIPLE SITES WITHOUT RHEUMATOID FACTOR: Primary | ICD-10-CM

## 2025-05-29 DIAGNOSIS — M06.09 RHEUMATOID ARTHRITIS OF MULTIPLE SITES WITHOUT RHEUMATOID FACTOR: ICD-10-CM

## 2025-05-29 LAB
ALBUMIN SERPL-MCNC: 4.3 G/DL (ref 3.5–5.2)
ALBUMIN/GLOB SERPL: 2 G/DL
ALP SERPL-CCNC: 67 U/L (ref 39–117)
ALT SERPL W P-5'-P-CCNC: 29 U/L (ref 1–33)
ANION GAP SERPL CALCULATED.3IONS-SCNC: 10 MMOL/L (ref 5–15)
AST SERPL-CCNC: 32 U/L (ref 1–32)
BASOPHILS # BLD AUTO: 0.08 10*3/MM3 (ref 0–0.2)
BASOPHILS NFR BLD AUTO: 2.1 % (ref 0–1.5)
BILIRUB SERPL-MCNC: 0.3 MG/DL (ref 0–1.2)
BUN SERPL-MCNC: 9 MG/DL (ref 8–23)
BUN/CREAT SERPL: 11.1 (ref 7–25)
CALCIUM SPEC-SCNC: 9.5 MG/DL (ref 8.6–10.5)
CHLORIDE SERPL-SCNC: 103 MMOL/L (ref 98–107)
CO2 SERPL-SCNC: 26 MMOL/L (ref 22–29)
CREAT SERPL-MCNC: 0.81 MG/DL (ref 0.57–1)
DEPRECATED RDW RBC AUTO: 44.8 FL (ref 37–54)
EGFRCR SERPLBLD CKD-EPI 2021: 82.7 ML/MIN/1.73
EOSINOPHIL # BLD AUTO: 0.34 10*3/MM3 (ref 0–0.4)
EOSINOPHIL NFR BLD AUTO: 8.9 % (ref 0.3–6.2)
ERYTHROCYTE [DISTWIDTH] IN BLOOD BY AUTOMATED COUNT: 13.2 % (ref 12.3–15.4)
GLOBULIN UR ELPH-MCNC: 2.2 GM/DL
GLUCOSE SERPL-MCNC: 90 MG/DL (ref 65–99)
HCT VFR BLD AUTO: 40.1 % (ref 34–46.6)
HGB BLD-MCNC: 12.8 G/DL (ref 12–15.9)
IMM GRANULOCYTES # BLD AUTO: 0.01 10*3/MM3 (ref 0–0.05)
IMM GRANULOCYTES NFR BLD AUTO: 0.3 % (ref 0–0.5)
LYMPHOCYTES # BLD AUTO: 1.32 10*3/MM3 (ref 0.7–3.1)
LYMPHOCYTES NFR BLD AUTO: 34.6 % (ref 19.6–45.3)
MCH RBC QN AUTO: 30 PG (ref 26.6–33)
MCHC RBC AUTO-ENTMCNC: 31.9 G/DL (ref 31.5–35.7)
MCV RBC AUTO: 94.1 FL (ref 79–97)
MONOCYTES # BLD AUTO: 0.3 10*3/MM3 (ref 0.1–0.9)
MONOCYTES NFR BLD AUTO: 7.9 % (ref 5–12)
NEUTROPHILS NFR BLD AUTO: 1.76 10*3/MM3 (ref 1.7–7)
NEUTROPHILS NFR BLD AUTO: 46.2 % (ref 42.7–76)
NRBC BLD AUTO-RTO: 0 /100 WBC (ref 0–0.2)
PLATELET # BLD AUTO: 199 10*3/MM3 (ref 140–450)
PMV BLD AUTO: 10 FL (ref 6–12)
POTASSIUM SERPL-SCNC: 4.4 MMOL/L (ref 3.5–5.2)
PROT SERPL-MCNC: 6.5 G/DL (ref 6–8.5)
RBC # BLD AUTO: 4.26 10*6/MM3 (ref 3.77–5.28)
SODIUM SERPL-SCNC: 139 MMOL/L (ref 136–145)
WBC NRBC COR # BLD AUTO: 3.81 10*3/MM3 (ref 3.4–10.8)

## 2025-05-29 PROCEDURE — 80053 COMPREHEN METABOLIC PANEL: CPT

## 2025-05-29 PROCEDURE — 85025 COMPLETE CBC W/AUTO DIFF WBC: CPT

## 2025-05-29 PROCEDURE — 36415 COLL VENOUS BLD VENIPUNCTURE: CPT

## 2025-06-12 DIAGNOSIS — F51.01 PRIMARY INSOMNIA: ICD-10-CM

## 2025-06-13 ENCOUNTER — OFFICE VISIT (OUTPATIENT)
Dept: FAMILY MEDICINE CLINIC | Facility: CLINIC | Age: 60
End: 2025-06-13
Payer: MEDICARE

## 2025-06-13 ENCOUNTER — OFFICE VISIT (OUTPATIENT)
Dept: CARDIOLOGY | Age: 60
End: 2025-06-13
Payer: MEDICARE

## 2025-06-13 VITALS
SYSTOLIC BLOOD PRESSURE: 120 MMHG | HEART RATE: 54 BPM | DIASTOLIC BLOOD PRESSURE: 70 MMHG | HEIGHT: 67 IN | WEIGHT: 166.2 LBS | BODY MASS INDEX: 26.09 KG/M2

## 2025-06-13 VITALS
HEIGHT: 67 IN | BODY MASS INDEX: 25.83 KG/M2 | DIASTOLIC BLOOD PRESSURE: 80 MMHG | RESPIRATION RATE: 16 BRPM | WEIGHT: 164.6 LBS | OXYGEN SATURATION: 97 % | SYSTOLIC BLOOD PRESSURE: 120 MMHG | HEART RATE: 86 BPM

## 2025-06-13 DIAGNOSIS — R53.82 CHRONIC FATIGUE: ICD-10-CM

## 2025-06-13 DIAGNOSIS — J30.2 SEASONAL ALLERGIC RHINITIS, UNSPECIFIED TRIGGER: ICD-10-CM

## 2025-06-13 DIAGNOSIS — R00.2 PALPITATIONS: Primary | ICD-10-CM

## 2025-06-13 DIAGNOSIS — I10 BENIGN ESSENTIAL HYPERTENSION: ICD-10-CM

## 2025-06-13 DIAGNOSIS — R53.83 MALAISE AND FATIGUE: ICD-10-CM

## 2025-06-13 DIAGNOSIS — K22.719 BARRETT'S ESOPHAGUS WITH DYSPLASIA: ICD-10-CM

## 2025-06-13 DIAGNOSIS — R26.89 BALANCE DISORDER: Primary | ICD-10-CM

## 2025-06-13 DIAGNOSIS — R25.1 TREMORS OF NERVOUS SYSTEM: ICD-10-CM

## 2025-06-13 DIAGNOSIS — R53.81 MALAISE AND FATIGUE: ICD-10-CM

## 2025-06-13 PROBLEM — M06.09 RHEUMATOID ARTHRITIS WITHOUT RHEUMATOID FACTOR, MULTIPLE SITES: Status: ACTIVE | Noted: 2025-06-13

## 2025-06-13 PROCEDURE — 3078F DIAST BP <80 MM HG: CPT | Performed by: NURSE PRACTITIONER

## 2025-06-13 PROCEDURE — 93000 ELECTROCARDIOGRAM COMPLETE: CPT | Performed by: NURSE PRACTITIONER

## 2025-06-13 PROCEDURE — 3074F SYST BP LT 130 MM HG: CPT | Performed by: NURSE PRACTITIONER

## 2025-06-13 PROCEDURE — 99214 OFFICE O/P EST MOD 30 MIN: CPT | Performed by: NURSE PRACTITIONER

## 2025-06-13 RX ORDER — METHOTREXATE 2.5 MG/1
TABLET ORAL
COMMUNITY
Start: 2025-04-10

## 2025-06-13 RX ORDER — FOLIC ACID 1 MG/1
TABLET ORAL DAILY
COMMUNITY
Start: 2025-04-10

## 2025-06-13 RX ORDER — CLONAZEPAM 0.5 MG/1
TABLET ORAL
Qty: 45 TABLET | Refills: 0 | Status: SHIPPED | OUTPATIENT
Start: 2025-06-13

## 2025-06-13 NOTE — ASSESSMENT & PLAN NOTE
BP is controlled well. She will recheck in six months. She will continue present meds. Prescription will be sent when notified by pharmacy..

## 2025-06-13 NOTE — PROGRESS NOTES
Assessment & Plan  Fatigue.  Progressive fatigue over the past year, not attributed to rheumatoid arthritis (RA) by her rheumatologist. Overwhelming fatigue multiple times a day, sometimes while driving. Recent cardiology evaluation suggested possible PACs or PVCs, but no significant findings on the Holter monitor. Thyroid function will be rechecked and a B12 level test will be conducted to rule out deficiencies.    Tremors.  Worsening whole-body tremors and increased frequency of falls. Significant fall a couple of months ago with likely blackout and head injury. Referral to a neurologist will be made for further evaluation.    Allergies.  Elevated eosinophil levels, possibly related to allergies. Super stuffed up today, has not taken medication due to concerns about liver enzyme interactions with current medications. Advised to use a nasal steroid like Nasacort and avoid tilting head back during application.    Back pain.  Worsening back pain limiting ability to walk distances. Scheduled for another pain pump trial due to significant side effects from current pain medication, including constipation.        Balance disorder    Orders:    Ambulatory Referral to Neurology    Benign essential hypertension  BP is controlled well. She will recheck in six months. She will continue present meds. Prescription will be sent when notified by pharmacy..             Patient was given instructions and counseling regarding her condition or for health maintenance advice. Please see specific information pulled into the AVS if appropriate.          Jaimee is a 60 y.o. being seen today for  Fatigue (Wants labs done today ), Tremors, and Fall (Increased falling )   HISTORY    HPI      The patient is a 60-year-old female who presents for evaluation of fatigue, tremors, and allergies.    She reports a progressive increase in fatigue over the past year, which she describes as overwhelming and spontaneous, occurring multiple times  daily. This fatigue has been severe enough to necessitate pulling over while driving. Her rheumatologist, Dr. Ayala, has ruled out rheumatoid arthritis (RA) as the cause of this fatigue. She also experiences palpitations and her cardiologist suspects premature atrial contractions (PACs) or premature ventricular contractions (PVCs), although these were not captured during her Holter monitor test. The patient was advised to consult with her primary care physician regarding potential vitamin deficiencies contributing to her fatigue. She reports improved sleep quality and does not believe she has sleep apnea, as her  has not observed any cessation of breathing during sleep. She rarely snores unless she is congested.    Additionally, she reports worsening tremors and an increase in falls, including a significant fall a few months ago that resulted in a head injury and possible loss of consciousness. She did not seek medical attention at that time due to her fatigue. Her daily routine is significantly impacted by her fatigue, often requiring hours of rest before she can muster the energy to move. However, she notes an improvement in her condition towards the end of the day.    She is currently experiencing nasal congestion. She has previously used Nasacort and decongestants for relief.    She is scheduled for another pain pump trial due to worsening back pain, which has severely limited her mobility. Despite increasing her pain medication dosage, she continues to experience significant pain and side effects such as constipation.     Social History  She  reports that she quit smoking about 5 years ago. Her smoking use included cigarettes. She started smoking about 35 years ago. She has a 15 pack-year smoking history. She has never been exposed to tobacco smoke. She has never used smokeless tobacco. She reports current alcohol use. She reports that she does not use drugs.  EXAM DATA    Vital Signs        BP Readings  from Last 1 Encounters:   06/13/25 120/80     Wt Readings from Last 3 Encounters:   06/13/25 74.7 kg (164 lb 9.6 oz)   06/13/25 75.4 kg (166 lb 3.2 oz)   09/09/24 73.7 kg (162 lb 8 oz)   Body mass index is 25.78 kg/m².  Physical Exam      General Appearance: Normal.  HEENT: Super stuffed up.  Respiratory: Clear  Cardiovascular: RRR w/o M, R, G  Skin: Warm and dry, no rash.  Psychiatric: Normal judgement and affect.            Patient or patient representative verbalized consent for the use of Ambient Listening during the visit with  Saumya Hurd MD for chart documentation. 6/13/2025  14:38 EDT

## 2025-06-13 NOTE — PATIENT INSTRUCTIONS
Gerard Rebolledo (but is a [facility] doctor - direct primary care)  Bailey Harper, Dwaine Alcantar, Néstor Henson -- Lupe

## 2025-06-13 NOTE — PROGRESS NOTES
"    CARDIOLOGY        Patient Name: Jaimee Leonardo  :1965  Age: 60 y.o.  Primary Cardiologist: Aldo Mary MD  Encounter Provider:  NAOMI Verduzco    Date of Service: 2025      CHIEF COMPLAINT / REASON FOR OFFICE VISIT     Hypertension      HPI  Jaimee Leonardo is a 60 y.o. female who presents today for annual assessment.     Pt has a  history significant for palpitations, hyperlipidemia, hypertension, Butler's esophagus.    Patient reports today for routine assessment as well as some complaints.  She states that she has continued to experience heart palpitations, she has a Kardia device and has strips which I have personally reviewed.  She notes that she experiences palpitations daily.  She primarily notes the palpitations in the evenings.  She does have known Butler's esophagus. She denies chest pain, shortness of breath.  She also adds that she has intermittent episodes of \"overwhelming fatigue\"  She is unsure of the cause, her PCP is aware as well as her rheumatologist.     HISTORY OF PRESENT ILLNESS     Was initially evaluated in clinic in 2023 for evaluation of dyspnea and heart palpitations.  Patient had a myocardial perfusion stress test as well as an MCOT    Myocardial perfusion stress test 2023.  Normal myocardial perfusion study without evidence of ischemia.  Impressions consistent with a low restudy.    MCOT 2023.  Normal monitor study.  11 triggered events without significant arrhythmia.    Echocardiogram 2024.  LVEF 68%.  Normal diastolic function.      The following portions of the patient's history were reviewed and updated as appropriate: allergies, current medications, past family history, past medical history, past social history, past surgical history and problem list.      VITAL SIGNS     Visit Vitals  /70 (BP Location: Left arm)   Pulse 54   Ht 170.2 cm (67.01\")   Wt 75.4 kg (166 lb 3.2 oz)   BMI 26.02 kg/m²         Wt Readings from " Last 3 Encounters:   06/13/25 75.4 kg (166 lb 3.2 oz)   09/09/24 73.7 kg (162 lb 8 oz)   06/06/24 74.3 kg (163 lb 12.8 oz)     Body mass index is 26.02 kg/m².      REVIEW OF SYSTEMS     Review of Systems   Constitutional: Positive for malaise/fatigue. Negative for chills, fever, weight gain and weight loss.   Cardiovascular:  Positive for palpitations. Negative for leg swelling.   Respiratory:  Negative for cough, snoring and wheezing.    Hematologic/Lymphatic: Negative for bleeding problem. Does not bruise/bleed easily.   Skin:  Negative for color change.   Musculoskeletal:  Negative for falls, joint pain and myalgias.   Gastrointestinal:  Negative for melena.   Genitourinary:  Negative for hematuria.   Neurological:  Negative for excessive daytime sleepiness.   Psychiatric/Behavioral:  Negative for depression. The patient is not nervous/anxious.            PHYSICAL EXAMINATION     Vitals and nursing note reviewed.   Constitutional:       Appearance: Normal appearance. Well-developed.   Eyes:      Conjunctiva/sclera: Conjunctivae normal.   Neck:      Vascular: No carotid bruit.   Pulmonary:      Breath sounds: Normal breath sounds.   Cardiovascular:      Normal rate. Regular rhythm. Normal S1 with normal intensity. Normal S2 with normal intensity.       Murmurs: There is no murmur.      No gallop.  No click. No rub.   Edema:     Peripheral edema absent.   Musculoskeletal: Normal range of motion. Skin:     General: Skin is warm and dry.   Neurological:      Mental Status: Alert and oriented to person, place, and time.      GCS: GCS eye subscore is 4. GCS verbal subscore is 5. GCS motor subscore is 6.   Psychiatric:         Mood and Affect: Mood is anxious.         Speech: Speech normal.         Behavior: Behavior normal.         Thought Content: Thought content normal.         Judgment: Judgment normal.           REVIEWED DATA       ECG 12 Lead    Date/Time: 6/13/2025 11:08 AM  Performed by: Salma Meyers  "APRN    Authorized by: Salma Meyers APRN  Comparison: compared with previous ECG from 6/6/2024  Rhythm: sinus bradycardia  Rate: bradycardic  BPM: 54  Conduction: conduction normal  ST Segments: ST segments normal  T Waves: T waves normal  QRS axis: normal    Clinical impression: normal ECG                  Lab Results   Component Value Date     05/29/2025     09/09/2024    K 4.4 05/29/2025    K 4.5 09/09/2024     05/29/2025     09/09/2024    CO2 26.0 05/29/2025    CO2 26.4 09/09/2024    BUN 9.0 05/29/2025    BUN 5 (L) 09/09/2024    CREATININE 0.81 05/29/2025    CREATININE 0.64 09/09/2024    EGFRIFNONA 98 08/06/2021    EGFRIFAFRI 113 08/06/2021    GLUCOSE 90 05/29/2025    GLUCOSE 90 09/09/2024    CALCIUM 9.5 05/29/2025    CALCIUM 9.5 09/09/2024    ALBUMIN 4.3 05/29/2025    ALBUMIN 4.3 09/09/2024    BILITOT 0.3 05/29/2025    BILITOT 0.3 09/09/2024    AST 32 05/29/2025    AST 32 09/09/2024    ALT 29 05/29/2025    ALT 24 09/09/2024     Lab Results   Component Value Date    WBC 3.81 05/29/2025    WBC 4.84 09/09/2024    HGB 12.8 05/29/2025    HGB 12.4 09/09/2024    HCT 40.1 05/29/2025    HCT 37.3 09/09/2024    MCV 94.1 05/29/2025    MCV 90.8 09/09/2024     05/29/2025     09/09/2024     Lab Results   Component Value Date    PROBNP <36.0 06/06/2024     No results found for: \"CKTOTAL\", \"CKMB\", \"CKMBINDEX\", \"TROPONINI\", \"TROPONINT\"  Lab Results   Component Value Date    TSH 1.520 09/09/2024    TSH 3.990 09/06/2023         ASSESSMENT & PLAN     Diagnoses and all orders for this visit:    1. Palpitations (Primary)  Assessment & Plan:  Patient has continued heart palpitations.  She has had a previous MCOT where diary entries did not correlate with any rhythm disturbances.  Patient has a Kardia device and has showed to me multiple rhythm strips during times of palpitations.  She has some noted artifact on rhythm strips but no true arrhythmia.  Patient has known Butler's esophagus, she " has not been evaluated by GI since 2023.  I think that patient would benefit from a GI evaluation.  Patient also has anxiety which could be contributing.      2. Benign essential hypertension  Overview:  Irbesartan 75 mg/day    Assessment & Plan:  Hypertension is stable and controlled  Continue current treatment regimen.  Dietary sodium restriction.  Regular aerobic exercise.  Ambulatory blood pressure monitoring.  Blood pressure will be reassessed in 1 year.      3. Butler's esophagus with dysplasia  Overview:  OVERVIEW:  Noted 11/1/2017 again w/EGD but pre-Butler's Description: [facility]      4. Chronic fatigue  Assessment & Plan:  Patient report increased extreme fatigue.  Patient has had a myocardial perfusion stress testing and echocardiogram within the past 1-2 years.  I have low suspicion of a cardiac etiology.  Encourage patient to reach out to PCP to see if she needs any further laboratory studies or possibly a sleep study.  Patient reports that she is very frustrated as she continues to have symptoms of palpitations and fatigue and she feels that no one cares.  I explained to patient that I definitely sympathize with her symptoms and believe that she is having symptoms however I do not feel that any cardiac testing is warranted at this time.      Other orders  -     ECG 12 Lead        Return in about 1 year (around 6/13/2026) for Dr. Vidales, routine assessment.    Future Appointments         Provider Department Center    6/13/2025 2:30 PM (Arrive by 2:15 PM) Saumya Hurd MD [facility] [place]    6/15/2026 11:15 AM (Arrive by 11:00 AM) Aldo Mary MD [facility] CARDIOLOGY [place]              MEDICATIONS         Discharge Medications            Accurate as of June 13, 2025  1:07 PM. If you have any questions, ask your nurse or doctor.                Continue These Medications        Instructions Start Date   atorvastatin 20 MG  tablet  Commonly known as: LIPITOR   20 mg, Oral, Daily      baclofen 10 MG tablet  Commonly known as: LIORESAL   No dose, route, or frequency recorded.      bisacodyl 5 MG EC tablet  Commonly known as: DULCOLAX   5 mg, Daily PRN      clonazePAM 0.5 MG tablet  Commonly known as: KlonoPIN   TAKE A HALF TABLET BY MOUTH EVERY MORNING AND TAKE ONE TABLET BY MOUTH EVERY EVENING      diphenhydrAMINE 25 mg capsule  Commonly known as: BENADRYL   25 mg, Every 6 Hours PRN      DULoxetine 60 MG capsule  Commonly known as: CYMBALTA   60 mg, Oral, Daily      EpiPen 2-Uri 0.3 MG/0.3ML solution auto-injector injection  Generic drug: EPINEPHrine   0.3 mg      folic acid 1 MG tablet  Commonly known as: FOLVITE   Daily      gabapentin 600 MG tablet  Commonly known as: NEURONTIN   600 mg, 3 Times Daily      irbesartan 75 MG tablet  Commonly known as: AVAPRO   75 mg, Oral, Daily      methotrexate 2.5 MG tablet   take 6 tablet by oral route one day every week      multivitamin tablet tablet  Commonly known as: THERAGRAN   Take by mouth.      naloxone 4 MG/0.1ML nasal spray  Commonly known as: NARCAN   1 spray, Nasal, As Needed      omeprazole 40 MG capsule  Commonly known as: priLOSEC   TAKE 1 CAPSULE BY MOUTH EVERY DAY      oxyCODONE 10 MG tablet  Commonly known as: ROXICODONE   Every 4 (Four) Hours.      rizatriptan 10 MG tablet  Commonly known as: MAXALT   TAKE 1 TABLET BY MOUTH AT ONSET OF HEADACHE; MAY REPEAT 1 TABLET IN 2 HOURS IF NEEDED.      traZODone 150 MG tablet  Commonly known as: DESYREL   150 mg, Oral, Nightly PRN                   **Dragon Disclaimer:   Much of this encounter note is an electronic transcription/translation of spoken language to printed text. The electronic translation of spoken language may permit erroneous, or at times, nonsensical words or phrases to be inadvertently transcribed. Although I have reviewed the note for such errors, some may still exist.

## 2025-06-13 NOTE — ASSESSMENT & PLAN NOTE
Patient has continued heart palpitations.  She has had a previous MCOT where diary entries did not correlate with any rhythm disturbances.  Patient has a Kardia device and has showed to me multiple rhythm strips during times of palpitations.  She has some noted artifact on rhythm strips but no true arrhythmia.  Patient has known Butler's esophagus, she has not been evaluated by GI since 2023.  I think that patient would benefit from a GI evaluation.  Patient also has anxiety which could be contributing.

## 2025-06-13 NOTE — ASSESSMENT & PLAN NOTE
Patient report increased extreme fatigue.  Patient has had a myocardial perfusion stress testing and echocardiogram within the past 1-2 years.  I have low suspicion of a cardiac etiology.  Encourage patient to reach out to PCP to see if she needs any further laboratory studies or possibly a sleep study.  Patient reports that she is very frustrated as she continues to have symptoms of palpitations and fatigue and she feels that no one cares.  I explained to patient that I definitely sympathize with her symptoms and believe that she is having symptoms however I do not feel that any cardiac testing is warranted at this time.

## 2025-06-14 LAB — VIT B12 SERPL-MCNC: 723 PG/ML (ref 211–946)

## 2025-06-19 LAB — 1,25(OH)2D SERPL-MCNC: 44.4 PG/ML (ref 24.8–81.5)

## 2025-06-24 DIAGNOSIS — M15.9 GENERALIZED OSTEOARTHRITIS: ICD-10-CM

## 2025-06-24 DIAGNOSIS — F33.1 MDD (MAJOR DEPRESSIVE DISORDER), RECURRENT EPISODE, MODERATE: ICD-10-CM

## 2025-06-24 DIAGNOSIS — F41.1 GAD (GENERALIZED ANXIETY DISORDER): ICD-10-CM

## 2025-06-24 DIAGNOSIS — G89.29 OTHER CHRONIC PAIN: ICD-10-CM

## 2025-06-24 RX ORDER — DULOXETIN HYDROCHLORIDE 60 MG/1
60 CAPSULE, DELAYED RELEASE ORAL DAILY
Qty: 90 CAPSULE | Refills: 1 | Status: SHIPPED | OUTPATIENT
Start: 2025-06-24

## 2025-06-30 NOTE — PROGRESS NOTES
"Chief Complaint  Balance disorder, black out, tremors    Patient or patient representative verbalized consent for the use of Ambient Listening during the visit with  Chano Hough MD PhD for chart documentation. 7/7/2025  13:00 EDT    Subjective      History of Present Illness:  Jaimee Leonardo \"Leatha\" is a delightful 60 y.o. right handed female who presents to [facility]ERY referred for balance disorder, black out, tremors with history of:  Past Medical History:   Diagnosis Date    Abnormal ECG     Allergic     Anxiety     Arthritis     Asthma 01/2023    Started having shortness of breath with exertion    Butler esophagus     Bradycardia     Colon polyp     CTS (carpal tunnel syndrome)     Depression     Eye inflammation     GERD (gastroesophageal reflux disease)     Head injury March 2025    Fell and hit head.    Headache     Headache, tension-type     Heart murmur     Hyperlipidemia     Hypertension     Irritable bowel syndrome     Low back pain     Migraine     Nicotine dependence 01/28/2016    Palpitation     S/P PICC central line placement 04/05/2021 4/5/2021       Shingles     Tremor     Visual impairment    Back pain limits ability to walk distances.  Unaccompanied today.    From PC note 6/13/2025:  Worsening whole-body tremors and increased frequency of falls. Significant fall a couple of months ago with likely blackout and head injury.   Referral to a neurologist will be made for further evaluation.       History of Present Illness  The patient is a 60-year-old female referred to neurology for a recent blackout versus seizure.    She experienced an unwitnessed blackout, the details of which are unclear to her. She recalls falling twice, hitting both sides of her head, and then blacking out for approximately 30 seconds. Upon regaining consciousness, she experienced bilateral temporal headaches. She did not seek immediate medical attention at the ER. She " reports no warning signs prior to the episode, such as weakness, visual changes, shortness of breath, chest pain, neck stiffness, or tingling in her hands. She also did not experience any loss of bowel or bladder control, tongue biting, or muscle pain. She remembers the moments before the fall and getting up afterward but is unsure if she tripped. She was oriented after the fall and reports no potential triggers for the episode. She continues to drive and has not had any neurological evaluations or imaging since the incident.    She has been experiencing intermittent tremors that seem to originate from her core, which have been increasing in frequency and duration. This has caused her concern and prompted her to seek a neurologist's opinion.    SOCIAL HISTORY:    Education Level: High school graduate    Alcohol: Rarely consumes alcohol    Recreational Drugs: No use of illegal drugs    Sleep: Averages 5 hours of sleep per night    FAMILY HISTORY  - Mother: Seizure after a stroke related to atrial fibrillation        All available pertinent Labs and Imaging personally reviewed, including:    Imaging:    No pertinent neuroimaging results found for this or any previous visit.        Labs:  Lab Results   Component Value Date    WBC 3.81 05/29/2025    HGB 12.8 05/29/2025    HCT 40.1 05/29/2025    MCV 94.1 05/29/2025     05/29/2025     Lab Results   Component Value Date    GLUCOSE 90 05/29/2025    BUN 9.0 05/29/2025    CREATININE 0.81 05/29/2025     05/29/2025    K 4.4 05/29/2025     05/29/2025    CALCIUM 9.5 05/29/2025    PROTEINTOT 6.5 05/29/2025    ALBUMIN 4.3 05/29/2025    ALT 29 05/29/2025    AST 32 05/29/2025    ALKPHOS 67 05/29/2025    BILITOT 0.3 05/29/2025    GLOB 2.2 05/29/2025    AGRATIO 2.0 05/29/2025    BCR 11.1 05/29/2025    ANIONGAP 10.0 05/29/2025    EGFR 82.7 05/29/2025     Lab Results   Component Value Date    HGBA1C 5.80 (H) 06/06/2024     Lab Results   Component Value Date    TSH  "1.520 09/09/2024     Lab Results   Component Value Date    LTYFDKXF32 723 06/13/2025     No results found for: \"FOLATE\"  Lab Results   Component Value Date    CHOL 137 06/06/2024    CHLPL 234 (H) 05/26/2023    TRIG 45 06/06/2024    HDL 65 (H) 06/06/2024    LDL 61 06/06/2024         Objective   Vital Signs:   /70 (BP Location: Right arm, Patient Position: Sitting, Cuff Size: Adult)   Pulse 67   Ht 170.2 cm (67\")   Wt 74.4 kg (164 lb)   BMI 25.69 kg/m²     GENERAL:  GEN: well appearing, no apparent distress, appropriately dressed and groomed.  HEENT: NCAT, nml symm facies, no LNA, no goiter  LUNGS: CTA carl, no wheezes/crackles/rhonchi noted  Card: RRR, no m noted, no carotid bruit, carl rad and dp pulses 2+ with cap refill < 2 sec  EXT: no cce, no rash noted    NEUROLOGICAL:  MS: A+O x 3, language spontaneous, fluent with logical content, no word finding, no repeating or perseveration, reported own and regarded as excellent historian, normal judgement and insight, somewhat anxious;   CN: PERRLA, full excursions in all cardinal directions with smooth pursuits throughout without saccadic breaks or nystagmus noted; horizontal and vertical saccades symmetric and on target. Cover test reveals no phoria. Visual fields full to confrontation. V1-III Light touch symm and intact; symm jaw clench masseter and temporalis bulk and tone, medial and lateral pterygoids intact. Symm forehead crease and grimace. Hearing grossly symm and intact to finger rub. Uvula and soft palate midline rise on gag. Sternocleidomastoids and traps symm and 5/5. Tongue demonstrates no furrowing and extends midline and into left and right.  MOTOR: no atrophy/drift, normal tone, strength 5-/5 diffusely with giveway (?pain), no tremor noted  SENSORY: LT/PP/Vib intact, symm, and wnL for age. Romberg - NEG  COORD: KIMMY/FTN/HTS with good rhythm, speed, and amplitude. No ataxia noted.  GAIT: Native mild wide based gait with mild decreased stride, nml " symm carl armswing, nml start/stop/turn. Toe and heel walk without difficulty. Tandem walk without difficulty.  DTR's: absent Ajs, o/w 2+ except 1+ right patella; no clonus, Babinski - Absent     Tremor tests:   Archimedes spiral, print, signature (see scanned sheet).  Extended arms/fingers: minimal tremor        ASSESSMENT & PLAN:    60 y.o. female who presents to [facility] NEUROLOGY [facility] NEUROSURGERY referred for balance disorder, black out, tremors.    From  note 6/13/2025:  Tremors.  Worsening whole-body tremors and increased frequency of falls. Significant fall a couple of months ago with likely blackout and head injury.   Referral to a neurologist will be made for further evaluation.            Diagnoses and all orders for this visit:    1. Black-out (not amnesia) (Primary)    2. Tremor    3. Neuropathy         Assessment & Plan  1. Blackout.  The patient experienced a brief blackout episode lasting approximately 30 seconds, with no prior history of seizures. The episode was unwitnessed, and she did not seek immediate medical attention. There were no warning signs or associated symptoms such as loss of bowel or bladder control, tongue biting, or muscle pain. She did not have any confusion post-episode and was able to drive afterward. No antiepileptics are needed at this time. If another episode occurs, she should seek immediate medical attention. This episode is very unlikely to be seizure and is much more consistent with a fall with perhaps head injury leading to brief blackout, and no further work up indicated.    2. Tremors.  The patient reports intermittent whole body tremors that have become more frequent and longer-lasting. The tremors are described as mild and not impairing her ability to function. The most common cause of adult-acquired tremor worldwide is anxiety. No treatment is recommended at this time unless the tremors begin to impair her daily activities. Whole body tremor can  occur with exhaustion and hypoglycemic events, but have no known work up from neurology. Possible that increasing patient chronic fatigue is contributory to worsening tremor.    3. Multifactorial gait disorder.  The patient has a wide-based gait, likely due to decreased sensory input from her feet and back pain. This increases her risk of falls. She also has moderate loss of vibration sensation in her right foot, contributing to the risk. She is advised to be cautious while walking and to spread her feet slightly apart for better stability. Patient had a spinal cord injury which she said was causative of her moderate vibration loss in toes and low back pain/dysfunction.            Follow Up  Return if symptoms worsen or fail to improve.    43 minutes were spent caring for Jaimee Leonardo on this date of service. This time spent by me includes preparing for the visit, reviewing tests, obtaining/reviewing separately obtained history, performing medically appropriate exam/evaluation, counseling/educating the patient/family/caregiver, ordering medications/tests/procedures, referring/communicating with other health care professionals, documenting information in the medical record, independently interpreting results and communicating that with the patient/family/caregiver and/or care coordination.     Patient was given instructions and counseling regarding her condition or for health maintenance advice. Please see specific information pulled into the AVS if appropriate.

## 2025-07-07 ENCOUNTER — OFFICE VISIT (OUTPATIENT)
Dept: NEUROLOGY | Facility: CLINIC | Age: 60
End: 2025-07-07
Payer: MEDICARE

## 2025-07-07 ENCOUNTER — PATIENT ROUNDING (BHMG ONLY) (OUTPATIENT)
Dept: NEUROLOGY | Facility: CLINIC | Age: 60
End: 2025-07-07
Payer: MEDICARE

## 2025-07-07 VITALS
SYSTOLIC BLOOD PRESSURE: 130 MMHG | DIASTOLIC BLOOD PRESSURE: 70 MMHG | HEART RATE: 67 BPM | WEIGHT: 164 LBS | BODY MASS INDEX: 25.74 KG/M2 | HEIGHT: 67 IN

## 2025-07-07 DIAGNOSIS — R55 BLACK-OUT (NOT AMNESIA): Primary | ICD-10-CM

## 2025-07-07 DIAGNOSIS — G62.9 NEUROPATHY: ICD-10-CM

## 2025-07-07 DIAGNOSIS — R25.1 TREMOR: ICD-10-CM

## 2025-07-07 PROCEDURE — 1159F MED LIST DOCD IN RCRD: CPT | Performed by: PSYCHIATRY & NEUROLOGY

## 2025-07-07 PROCEDURE — 3078F DIAST BP <80 MM HG: CPT | Performed by: PSYCHIATRY & NEUROLOGY

## 2025-07-07 PROCEDURE — 1160F RVW MEDS BY RX/DR IN RCRD: CPT | Performed by: PSYCHIATRY & NEUROLOGY

## 2025-07-07 PROCEDURE — 3075F SYST BP GE 130 - 139MM HG: CPT | Performed by: PSYCHIATRY & NEUROLOGY

## 2025-07-07 PROCEDURE — 99203 OFFICE O/P NEW LOW 30 MIN: CPT | Performed by: PSYCHIATRY & NEUROLOGY

## 2025-07-13 DIAGNOSIS — F51.01 PRIMARY INSOMNIA: ICD-10-CM

## 2025-07-14 RX ORDER — CLONAZEPAM 0.5 MG/1
TABLET ORAL
Qty: 45 TABLET | Refills: 0 | Status: SHIPPED | OUTPATIENT
Start: 2025-07-14

## 2025-08-08 DIAGNOSIS — G47.00 INSOMNIA, UNSPECIFIED TYPE: ICD-10-CM

## 2025-08-08 RX ORDER — TRAZODONE HYDROCHLORIDE 150 MG/1
150 TABLET ORAL NIGHTLY PRN
Qty: 90 TABLET | Refills: 1 | Status: SHIPPED | OUTPATIENT
Start: 2025-08-08

## 2025-08-13 RX ORDER — IRBESARTAN 75 MG/1
75 TABLET ORAL DAILY
Qty: 90 TABLET | Refills: 1 | Status: SHIPPED | OUTPATIENT
Start: 2025-08-13

## 2025-08-24 DIAGNOSIS — F51.01 PRIMARY INSOMNIA: ICD-10-CM

## 2025-08-25 RX ORDER — CLONAZEPAM 0.5 MG/1
TABLET ORAL
Qty: 45 TABLET | Refills: 0 | Status: SHIPPED | OUTPATIENT
Start: 2025-08-25

## (undated) DEVICE — FLEX ADVANTAGE 1500CC: Brand: FLEX ADVANTAGE

## (undated) DEVICE — ADAPT CLN SCPE ENDO PORPOISE BX/50 DISP

## (undated) DEVICE — CANN O2 ETCO2 FITS ALL CONN CO2 SMPL A/ 7IN DISP LF

## (undated) DEVICE — BITEBLOCK OMNI BLOC

## (undated) DEVICE — Device: Brand: SPOT EX ENDOSCOPIC TATTOO

## (undated) DEVICE — SINGLE-USE BIOPSY FORCEPS: Brand: RADIAL JAW 4

## (undated) DEVICE — NDL SCLEROTHERAPY INTERJECT 25G 4 240CM

## (undated) DEVICE — LASSO POLYPECTOMY SNARE: Brand: LASSO

## (undated) DEVICE — VIAL FORMLN CAP 10PCT 20ML

## (undated) DEVICE — GOWN PROC ENDOARMOR GI LVL3 HY/SHLD UNIV

## (undated) DEVICE — KT ORCA ORCAPOD DISP STRL

## (undated) DEVICE — Device

## (undated) DEVICE — MSK ENDO PORT O2 POM ELITE CURAPLEX A/

## (undated) DEVICE — THE SINGLE USE ETRAP – POLYP TRAP IS USED FOR SUCTION RETRIEVAL OF ENDOSCOPICALLY REMOVED POLYPS.: Brand: ETRAP